# Patient Record
Sex: FEMALE | Race: WHITE | NOT HISPANIC OR LATINO
[De-identification: names, ages, dates, MRNs, and addresses within clinical notes are randomized per-mention and may not be internally consistent; named-entity substitution may affect disease eponyms.]

---

## 2017-02-09 ENCOUNTER — RX RENEWAL (OUTPATIENT)
Age: 62
End: 2017-02-09

## 2017-02-17 ENCOUNTER — RX RENEWAL (OUTPATIENT)
Age: 62
End: 2017-02-17

## 2017-03-09 ENCOUNTER — APPOINTMENT (OUTPATIENT)
Dept: ENDOCRINOLOGY | Facility: CLINIC | Age: 62
End: 2017-03-09

## 2017-03-09 DIAGNOSIS — Z80.9 FAMILY HISTORY OF MALIGNANT NEOPLASM, UNSPECIFIED: ICD-10-CM

## 2017-03-13 LAB
24R-OH-CALCIDIOL SERPL-MCNC: 67.6 PG/ML
25(OH)D3 SERPL-MCNC: 27.4 NG/ML
ALBUMIN SERPL ELPH-MCNC: 4.4 G/DL
ALP BLD-CCNC: 77 U/L
ALT SERPL-CCNC: 21 U/L
ANION GAP SERPL CALC-SCNC: 14 MMOL/L
AST SERPL-CCNC: 19 U/L
BILIRUB SERPL-MCNC: 0.3 MG/DL
BUN SERPL-MCNC: 17 MG/DL
CALCIUM SERPL-MCNC: 9.4 MG/DL
CALCIUM SERPL-MCNC: 9.4 MG/DL
CHLORIDE SERPL-SCNC: 104 MMOL/L
CO2 SERPL-SCNC: 26 MMOL/L
CREAT SERPL-MCNC: 0.72 MG/DL
GLUCOSE SERPL-MCNC: 52 MG/DL
PARATHYROID HORMONE INTACT: 69 PG/ML
POTASSIUM SERPL-SCNC: 4 MMOL/L
PROT SERPL-MCNC: 7.2 G/DL
SODIUM SERPL-SCNC: 144 MMOL/L

## 2017-03-28 ENCOUNTER — APPOINTMENT (OUTPATIENT)
Dept: ENDOCRINOLOGY | Facility: CLINIC | Age: 62
End: 2017-03-28

## 2017-03-28 VITALS — WEIGHT: 210 LBS | BODY MASS INDEX: 33.75 KG/M2 | HEIGHT: 66 IN

## 2017-05-22 ENCOUNTER — RX RENEWAL (OUTPATIENT)
Age: 62
End: 2017-05-22

## 2017-06-05 ENCOUNTER — RX RENEWAL (OUTPATIENT)
Age: 62
End: 2017-06-05

## 2017-06-26 ENCOUNTER — RX RENEWAL (OUTPATIENT)
Age: 62
End: 2017-06-26

## 2017-06-27 ENCOUNTER — APPOINTMENT (OUTPATIENT)
Dept: ENDOCRINOLOGY | Facility: CLINIC | Age: 62
End: 2017-06-27

## 2017-07-03 ENCOUNTER — APPOINTMENT (OUTPATIENT)
Dept: ENDOCRINOLOGY | Facility: CLINIC | Age: 62
End: 2017-07-03

## 2017-07-03 VITALS
HEART RATE: 86 BPM | DIASTOLIC BLOOD PRESSURE: 65 MMHG | SYSTOLIC BLOOD PRESSURE: 131 MMHG | WEIGHT: 209 LBS | BODY MASS INDEX: 33.59 KG/M2 | HEIGHT: 66 IN

## 2017-07-03 DIAGNOSIS — E66.3 OVERWEIGHT: ICD-10-CM

## 2017-07-11 ENCOUNTER — APPOINTMENT (OUTPATIENT)
Dept: ENDOCRINOLOGY | Facility: CLINIC | Age: 62
End: 2017-07-11

## 2017-07-14 ENCOUNTER — RX RENEWAL (OUTPATIENT)
Age: 62
End: 2017-07-14

## 2017-07-14 ENCOUNTER — OTHER (OUTPATIENT)
Age: 62
End: 2017-07-14

## 2017-07-14 LAB
ALBUMIN SERPL ELPH-MCNC: 4.3 G/DL
ALP BLD-CCNC: 84 U/L
ALT SERPL-CCNC: 25 U/L
ANION GAP SERPL CALC-SCNC: 16 MMOL/L
AST SERPL-CCNC: 21 U/L
BILIRUB SERPL-MCNC: 0.2 MG/DL
BUN SERPL-MCNC: 18 MG/DL
CALCIUM SERPL-MCNC: 9.9 MG/DL
CHLORIDE SERPL-SCNC: 102 MMOL/L
CO2 SERPL-SCNC: 25 MMOL/L
CREAT SERPL-MCNC: 0.93 MG/DL
CREAT SPEC-SCNC: 72 MG/DL
GLUCOSE SERPL-MCNC: 69 MG/DL
MICROALBUMIN 24H UR DL<=1MG/L-MCNC: 0.8 MG/DL
MICROALBUMIN/CREAT 24H UR-RTO: 11 MG/G
POTASSIUM SERPL-SCNC: 4.3 MMOL/L
PROT SERPL-MCNC: 7.4 G/DL
SODIUM SERPL-SCNC: 143 MMOL/L
T4 FREE SERPL-MCNC: 1.3 NG/DL
TSH SERPL-ACNC: 2.51 UIU/ML

## 2017-07-31 ENCOUNTER — APPOINTMENT (OUTPATIENT)
Dept: VASCULAR SURGERY | Facility: CLINIC | Age: 62
End: 2017-07-31
Payer: COMMERCIAL

## 2017-07-31 DIAGNOSIS — Z86.39 PERSONAL HISTORY OF OTHER ENDOCRINE, NUTRITIONAL AND METABOLIC DISEASE: ICD-10-CM

## 2017-07-31 PROCEDURE — 93970 EXTREMITY STUDY: CPT

## 2017-07-31 PROCEDURE — 99243 OFF/OP CNSLTJ NEW/EST LOW 30: CPT | Mod: 25

## 2017-08-03 PROBLEM — Z86.39 HISTORY OF HYPERLIPIDEMIA: Status: RESOLVED | Noted: 2017-08-03 | Resolved: 2017-08-03

## 2017-08-11 ENCOUNTER — OTHER (OUTPATIENT)
Age: 62
End: 2017-08-11

## 2017-08-11 ENCOUNTER — MEDICATION RENEWAL (OUTPATIENT)
Age: 62
End: 2017-08-11

## 2017-08-15 ENCOUNTER — OTHER (OUTPATIENT)
Age: 62
End: 2017-08-15

## 2017-08-22 ENCOUNTER — APPOINTMENT (OUTPATIENT)
Dept: ENDOCRINOLOGY | Facility: CLINIC | Age: 62
End: 2017-08-22

## 2017-08-24 ENCOUNTER — RX RENEWAL (OUTPATIENT)
Age: 62
End: 2017-08-24

## 2017-09-01 ENCOUNTER — OTHER (OUTPATIENT)
Age: 62
End: 2017-09-01

## 2017-09-21 ENCOUNTER — RX RENEWAL (OUTPATIENT)
Age: 62
End: 2017-09-21

## 2017-09-25 ENCOUNTER — RESULT REVIEW (OUTPATIENT)
Age: 62
End: 2017-09-25

## 2017-10-16 ENCOUNTER — RX RENEWAL (OUTPATIENT)
Age: 62
End: 2017-10-16

## 2017-11-13 ENCOUNTER — OTHER (OUTPATIENT)
Age: 62
End: 2017-11-13

## 2017-11-15 ENCOUNTER — APPOINTMENT (OUTPATIENT)
Dept: ENDOCRINOLOGY | Facility: CLINIC | Age: 62
End: 2017-11-15
Payer: COMMERCIAL

## 2017-11-15 VITALS
BODY MASS INDEX: 33.27 KG/M2 | DIASTOLIC BLOOD PRESSURE: 72 MMHG | HEIGHT: 66 IN | HEART RATE: 85 BPM | WEIGHT: 207 LBS | SYSTOLIC BLOOD PRESSURE: 146 MMHG

## 2017-11-15 PROCEDURE — 99214 OFFICE O/P EST MOD 30 MIN: CPT

## 2017-12-06 ENCOUNTER — RX RENEWAL (OUTPATIENT)
Age: 62
End: 2017-12-06

## 2018-01-02 ENCOUNTER — RX RENEWAL (OUTPATIENT)
Age: 63
End: 2018-01-02

## 2018-02-08 ENCOUNTER — RX RENEWAL (OUTPATIENT)
Age: 63
End: 2018-02-08

## 2018-02-21 ENCOUNTER — OTHER (OUTPATIENT)
Age: 63
End: 2018-02-21

## 2018-02-27 ENCOUNTER — OTHER (OUTPATIENT)
Age: 63
End: 2018-02-27

## 2018-03-13 ENCOUNTER — OTHER (OUTPATIENT)
Age: 63
End: 2018-03-13

## 2018-03-16 ENCOUNTER — FORM ENCOUNTER (OUTPATIENT)
Age: 63
End: 2018-03-16

## 2018-03-17 ENCOUNTER — APPOINTMENT (OUTPATIENT)
Dept: ULTRASOUND IMAGING | Facility: HOSPITAL | Age: 63
End: 2018-03-17
Payer: COMMERCIAL

## 2018-03-17 ENCOUNTER — OUTPATIENT (OUTPATIENT)
Dept: OUTPATIENT SERVICES | Facility: HOSPITAL | Age: 63
LOS: 1 days | End: 2018-03-17
Payer: COMMERCIAL

## 2018-03-17 PROCEDURE — 76536 US EXAM OF HEAD AND NECK: CPT

## 2018-03-17 PROCEDURE — 76536 US EXAM OF HEAD AND NECK: CPT | Mod: 26

## 2018-03-20 ENCOUNTER — APPOINTMENT (OUTPATIENT)
Dept: ENDOCRINOLOGY | Facility: CLINIC | Age: 63
End: 2018-03-20

## 2018-03-26 ENCOUNTER — OTHER (OUTPATIENT)
Age: 63
End: 2018-03-26

## 2018-03-31 ENCOUNTER — RESULT CHARGE (OUTPATIENT)
Age: 63
End: 2018-03-31

## 2018-03-31 ENCOUNTER — OTHER (OUTPATIENT)
Age: 63
End: 2018-03-31

## 2018-05-03 ENCOUNTER — RX RENEWAL (OUTPATIENT)
Age: 63
End: 2018-05-03

## 2018-05-04 ENCOUNTER — RX RENEWAL (OUTPATIENT)
Age: 63
End: 2018-05-04

## 2018-06-01 ENCOUNTER — RX RENEWAL (OUTPATIENT)
Age: 63
End: 2018-06-01

## 2018-06-01 ENCOUNTER — OTHER (OUTPATIENT)
Age: 63
End: 2018-06-01

## 2018-06-18 ENCOUNTER — RX RENEWAL (OUTPATIENT)
Age: 63
End: 2018-06-18

## 2018-06-22 ENCOUNTER — OTHER (OUTPATIENT)
Age: 63
End: 2018-06-22

## 2018-06-22 RX ORDER — PEN NEEDLE, DIABETIC 29 G X1/2"
31G X 5 MM NEEDLE, DISPOSABLE MISCELLANEOUS
Qty: 200 | Refills: 5 | Status: ACTIVE | COMMUNITY
Start: 2018-06-22 | End: 1900-01-01

## 2018-06-28 ENCOUNTER — APPOINTMENT (OUTPATIENT)
Dept: ENDOCRINOLOGY | Facility: CLINIC | Age: 63
End: 2018-06-28
Payer: COMMERCIAL

## 2018-06-28 ENCOUNTER — RX RENEWAL (OUTPATIENT)
Age: 63
End: 2018-06-28

## 2018-06-28 VITALS
HEART RATE: 87 BPM | HEIGHT: 66 IN | BODY MASS INDEX: 33.27 KG/M2 | SYSTOLIC BLOOD PRESSURE: 135 MMHG | WEIGHT: 207 LBS | DIASTOLIC BLOOD PRESSURE: 72 MMHG

## 2018-06-28 PROCEDURE — 99215 OFFICE O/P EST HI 40 MIN: CPT

## 2018-07-05 ENCOUNTER — OTHER (OUTPATIENT)
Age: 63
End: 2018-07-05

## 2018-07-10 ENCOUNTER — RX RENEWAL (OUTPATIENT)
Age: 63
End: 2018-07-10

## 2018-07-26 LAB
ALBUMIN SERPL ELPH-MCNC: 4.2 G/DL
ALP BLD-CCNC: 74 U/L
ALT SERPL-CCNC: 19 U/L
ANION GAP SERPL CALC-SCNC: 12 MMOL/L
AST SERPL-CCNC: 16 U/L
BILIRUB SERPL-MCNC: 0.3 MG/DL
BUN SERPL-MCNC: 23 MG/DL
CALCIUM SERPL-MCNC: 9.7 MG/DL
CHLORIDE SERPL-SCNC: 104 MMOL/L
CHOLEST SERPL-MCNC: 156 MG/DL
CHOLEST/HDLC SERPL: 2.1 RATIO
CO2 SERPL-SCNC: 27 MMOL/L
CREAT SERPL-MCNC: 0.87 MG/DL
CREAT SPEC-SCNC: 129 MG/DL
GLUCOSE SERPL-MCNC: 144 MG/DL
HBA1C MFR BLD HPLC: 8 %
HDLC SERPL-MCNC: 75 MG/DL
LDLC SERPL CALC-MCNC: 70 MG/DL
MICROALBUMIN 24H UR DL<=1MG/L-MCNC: 0.3 MG/DL
MICROALBUMIN/CREAT 24H UR-RTO: 2 MG/G
POTASSIUM SERPL-SCNC: 4.7 MMOL/L
PROT SERPL-MCNC: 7.3 G/DL
SODIUM SERPL-SCNC: 143 MMOL/L
TRIGL SERPL-MCNC: 55 MG/DL
TSH SERPL-ACNC: 2.55 UIU/ML

## 2018-08-21 ENCOUNTER — APPOINTMENT (OUTPATIENT)
Dept: ENDOCRINOLOGY | Facility: CLINIC | Age: 63
End: 2018-08-21
Payer: COMMERCIAL

## 2018-08-21 VITALS — BODY MASS INDEX: 33.43 KG/M2 | WEIGHT: 208 LBS | HEIGHT: 66 IN

## 2018-08-21 PROCEDURE — 76942 ECHO GUIDE FOR BIOPSY: CPT | Mod: 76

## 2018-08-21 PROCEDURE — 10022: CPT | Mod: 59

## 2018-08-21 PROCEDURE — 99213 OFFICE O/P EST LOW 20 MIN: CPT | Mod: 25

## 2018-11-23 ENCOUNTER — RX RENEWAL (OUTPATIENT)
Age: 63
End: 2018-11-23

## 2019-01-03 ENCOUNTER — APPOINTMENT (OUTPATIENT)
Dept: ENDOCRINOLOGY | Facility: CLINIC | Age: 64
End: 2019-01-03
Payer: COMMERCIAL

## 2019-01-03 VITALS
WEIGHT: 198 LBS | HEART RATE: 96 BPM | SYSTOLIC BLOOD PRESSURE: 147 MMHG | HEIGHT: 66 IN | BODY MASS INDEX: 31.82 KG/M2 | DIASTOLIC BLOOD PRESSURE: 81 MMHG

## 2019-01-03 PROCEDURE — 36415 COLL VENOUS BLD VENIPUNCTURE: CPT

## 2019-01-03 PROCEDURE — 99215 OFFICE O/P EST HI 40 MIN: CPT | Mod: 25

## 2019-01-03 NOTE — ASSESSMENT
[Long Term Vascular Complications] : long term vascular complications of diabetes [Importance of Diet and Exercise] : importance of diet and exercise to improve glycemic control, achieve weight loss and improve cardiovascular health [Action and use of Insulin] : action and use of short and long-acting insulin [Self Monitoring of Blood Glucose] : self monitoring of blood glucose [FreeTextEntry1] : #1- uncontrolled diabetes mellitus July 2018 A1c 8%\par Diabetes treatment goals discussed, targets of preprandial around 100 and postprandial below 180\par Lower extremities no pitting edema being evaluated by vascular\par Continue with multiple insulin injections included NPH at bedtime\par Benefits of weight loss and increase physical activity as explained\par \par #2- thyroid nodules, recent FNA benign\par Monitor thyroid size growth rate\par \par #3- clinically euthyroid on thyroid supplementation

## 2019-01-03 NOTE — HISTORY OF PRESENT ILLNESS
[FreeTextEntry1] : long-standing history of diabetes, also history of thyroid nodules\par Recently diagnosed with a upper respiratory infection which she continues having symptoms. No fever\par 8/21/18 Thyroid FNA biopsy : [place] II\par Saw vascular MD in [place] Dr. 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: normal\par Sees Cardilogist at [facility] for palpitations, next appointment is with electrophysiologist\par  Meds: Humalog 10 u ac for breakfast and lunch and 10-14 u for dinner, NPH 22 u at Hs , and Lantus 22u qd\par  FS: < 140's

## 2019-01-03 NOTE — PHYSICAL EXAM
[Alert] : alert [Normal Outer Ear/Nose] : the ears and nose were normal in appearance [No Neck Mass] : no neck mass was observed [Normal PMI] : the apical impulse was normal [Normal Bowel Sounds] : normal bowel sounds [No CVA Tenderness] : no ~M costovertebral angle tenderness [Normal Gait] : normal gait [No Rash] : no rash [Cranial Nerves Intact] : cranial nerves 2-12 were intact [Oriented x3] : oriented to person, place, and time [de-identified] : ou rtp [de-identified] : no palpable nodules [de-identified] : lawson bilaterally [de-identified] : right lower extremity 1+ DP

## 2019-01-03 NOTE — REVIEW OF SYSTEMS
[Polyuria] : polyuria [Nocturia] : nocturia [Joint Pain] : joint pain [Negative] : Gastrointestinal [Dizziness] : no dizziness [Pain/Numbness of Digits] : no pain/numbness of digits [FreeTextEntry3] : OU Retinopathy

## 2019-01-24 LAB
ALBUMIN SERPL ELPH-MCNC: 3.9 G/DL
ALP BLD-CCNC: 65 U/L
ALT SERPL-CCNC: 19 U/L
ANION GAP SERPL CALC-SCNC: 10 MMOL/L
AST SERPL-CCNC: 19 U/L
BILIRUB SERPL-MCNC: 0.2 MG/DL
BUN SERPL-MCNC: 15 MG/DL
CALCIUM SERPL-MCNC: 9.1 MG/DL
CHLORIDE SERPL-SCNC: 103 MMOL/L
CHOLEST SERPL-MCNC: 192 MG/DL
CHOLEST/HDLC SERPL: 4 RATIO
CO2 SERPL-SCNC: 27 MMOL/L
CREAT SERPL-MCNC: 0.82 MG/DL
CREAT SPEC-SCNC: 149 MG/DL
GLUCOSE SERPL-MCNC: 140 MG/DL
HBA1C MFR BLD HPLC: 8 %
HDLC SERPL-MCNC: 48 MG/DL
LDLC SERPL CALC-MCNC: 128 MG/DL
MICROALBUMIN 24H UR DL<=1MG/L-MCNC: <1.2 MG/DL
MICROALBUMIN/CREAT 24H UR-RTO: NORMAL
POTASSIUM SERPL-SCNC: 4.7 MMOL/L
PROT SERPL-MCNC: 6.8 G/DL
SODIUM SERPL-SCNC: 140 MMOL/L
TRIGL SERPL-MCNC: 82 MG/DL
TSH SERPL-ACNC: 3.21 UIU/ML

## 2019-01-30 ENCOUNTER — RX RENEWAL (OUTPATIENT)
Age: 64
End: 2019-01-30

## 2019-02-04 ENCOUNTER — OTHER (OUTPATIENT)
Age: 64
End: 2019-02-04

## 2019-02-21 ENCOUNTER — RX RENEWAL (OUTPATIENT)
Age: 64
End: 2019-02-21

## 2019-03-20 ENCOUNTER — RX RENEWAL (OUTPATIENT)
Age: 64
End: 2019-03-20

## 2019-04-19 ENCOUNTER — OTHER (OUTPATIENT)
Age: 64
End: 2019-04-19

## 2019-05-22 ENCOUNTER — RX RENEWAL (OUTPATIENT)
Age: 64
End: 2019-05-22

## 2019-06-20 ENCOUNTER — APPOINTMENT (OUTPATIENT)
Dept: ENDOCRINOLOGY | Facility: CLINIC | Age: 64
End: 2019-06-20

## 2019-07-02 ENCOUNTER — APPOINTMENT (OUTPATIENT)
Dept: ENDOCRINOLOGY | Facility: CLINIC | Age: 64
End: 2019-07-02
Payer: COMMERCIAL

## 2019-07-02 VITALS
WEIGHT: 207 LBS | HEART RATE: 67 BPM | DIASTOLIC BLOOD PRESSURE: 83 MMHG | BODY MASS INDEX: 33.41 KG/M2 | SYSTOLIC BLOOD PRESSURE: 142 MMHG

## 2019-07-02 PROCEDURE — 99214 OFFICE O/P EST MOD 30 MIN: CPT | Mod: 25

## 2019-07-02 PROCEDURE — 82962 GLUCOSE BLOOD TEST: CPT

## 2019-07-05 NOTE — HISTORY OF PRESENT ILLNESS
[FreeTextEntry1] : 8/21/18 Thyroid FNA biopsy : [place] II\par 6/22/19 s. 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 Kal Prince\par Angio done in July at [facility]: normal\par Sees Cardiologist at [facility] for palpitations, next appointment is with electrophysiologist\par \par Today pt presents for endocrine f/u. Pt rarely has episodes of hypoglycemia. \par Pt states she sees a cardiologist every 4 months now and that he told her she still has skipped heart beats. \par She notes she had an angiogram last summer.\par Pt gained 8lbs since last visit. \par \par Meds: Humalog 10 u ac for breakfast and lunch and 10-14 u for dinner, Humulin at night 24u, NPH 22 u at Hs , Lantus 26u QD, Atorvastatin 20mg, Levothyroxine 112mcg

## 2019-07-05 NOTE — ASSESSMENT
[Long Term Vascular Complications] : long term vascular complications of diabetes [Importance of Diet and Exercise] : importance of diet and exercise to improve glycemic control, achieve weight loss and improve cardiovascular health [Action and use of Insulin] : action and use of short and long-acting insulin [Self Monitoring of Blood Glucose] : self monitoring of blood glucose [FreeTextEntry1] : 1. Hx of T2DM, clinically improved:\par Pt is on multiple insulin injections including NPH at bed time. Recommend pt continue present DM management, f/u with podiatrist and ophthalmologist.\par \par 2. Hx of hypothyroidism:\par Pt is clinically euthyroid. Recommend pt continue 112mcg Levothyroxine.\par \par Return in 4 months.

## 2019-07-05 NOTE — REVIEW OF SYSTEMS
[Recent Weight Gain (___ Lbs)] : recent [unfilled] ~Ulb weight gain [As Noted in HPI] : as noted in HPI [Negative] : Psychiatric [Dizziness] : no dizziness [Pain/Numbness of Digits] : no pain/numbness of digits [de-identified] : rare episodes of hypoglycemia

## 2019-07-05 NOTE — PHYSICAL EXAM
[Alert] : alert [Normal Outer Ear/Nose] : the ears and nose were normal in appearance [No Neck Mass] : no neck mass was observed [Normal PMI] : the apical impulse was normal [Normal Bowel Sounds] : normal bowel sounds [No Rash] : no rash [Normal Gait] : normal gait [Cranial Nerves Intact] : cranial nerves 2-12 were intact [Oriented x3] : oriented to person, place, and time [Spine Straight] : spine straight [Right Foot Was Examined] : right foot ~C was examined [Left Foot Was Examined] : left foot ~C was examined [2+] : 2+ in the dorsalis pedis [No Respiratory Distress] : no respiratory distress [de-identified] : ou rtp [de-identified] : no palpable nodules [de-identified] : 1+ b/l pitting edema

## 2019-09-02 LAB — GLUCOSE BLDC GLUCOMTR-MCNC: 69

## 2019-11-11 ENCOUNTER — APPOINTMENT (OUTPATIENT)
Dept: ENDOCRINOLOGY | Facility: CLINIC | Age: 64
End: 2019-11-11
Payer: COMMERCIAL

## 2019-11-11 VITALS
BODY MASS INDEX: 33.27 KG/M2 | HEIGHT: 66 IN | WEIGHT: 207 LBS | HEART RATE: 86 BPM | DIASTOLIC BLOOD PRESSURE: 64 MMHG | SYSTOLIC BLOOD PRESSURE: 132 MMHG

## 2019-11-11 DIAGNOSIS — M81.0 AGE-RELATED OSTEOPOROSIS W/OUT CURRENT PATHOLOGICAL FRACTURE: ICD-10-CM

## 2019-11-11 LAB — GLUCOSE BLDC GLUCOMTR-MCNC: 98

## 2019-11-11 PROCEDURE — 82962 GLUCOSE BLOOD TEST: CPT | Mod: NC

## 2019-11-11 PROCEDURE — 99214 OFFICE O/P EST MOD 30 MIN: CPT | Mod: 25

## 2019-11-12 PROBLEM — M81.0 OSTEOPOROSIS, POSTMENOPAUSAL: Status: ACTIVE | Noted: 2017-03-09

## 2019-11-21 ENCOUNTER — FORM ENCOUNTER (OUTPATIENT)
Age: 64
End: 2019-11-21

## 2019-11-22 ENCOUNTER — APPOINTMENT (OUTPATIENT)
Dept: RADIOLOGY | Facility: CLINIC | Age: 64
End: 2019-11-22
Payer: COMMERCIAL

## 2019-11-22 ENCOUNTER — OUTPATIENT (OUTPATIENT)
Dept: OUTPATIENT SERVICES | Facility: HOSPITAL | Age: 64
LOS: 1 days | End: 2019-11-22

## 2019-11-22 PROCEDURE — 77080 DXA BONE DENSITY AXIAL: CPT | Mod: 26

## 2019-12-18 ENCOUNTER — RX RENEWAL (OUTPATIENT)
Age: 64
End: 2019-12-18

## 2019-12-18 ENCOUNTER — OTHER (OUTPATIENT)
Age: 64
End: 2019-12-18

## 2020-01-07 NOTE — ASSESSMENT
[Long Term Vascular Complications] : long term vascular complications of diabetes [Importance of Diet and Exercise] : importance of diet and exercise to improve glycemic control, achieve weight loss and improve cardiovascular health [Action and use of Insulin] : action and use of short and long-acting insulin [Self Monitoring of Blood Glucose] : self monitoring of blood glucose [Levothyroxine] : The patient was instructed to take Levothyroxine on an empty stomach, separate from vitamins, and wait at least 30 minutes before eating [FreeTextEntry1] : 63 y/o F with\par \par 1. Hx of T1DM,fair controlled\par Pt is on multiple insulin injections including NPH. Pt had cardiac stress test, no need for revascularization\par Hyperlipidemia - Pt is on statins\par \par 2. Hx of thyroid nodule L 1.7cm nodule, stable:\par Will reassess in one year.\par \par 3. Hx of osteoporosis\par Reassessed, ordered bone density\par \par f/u 3 months

## 2020-01-07 NOTE — REVIEW OF SYSTEMS
[Negative] : Musculoskeletal [Dizziness] : no dizziness [de-identified] : rare episodes of hypoglycemia  [Pain/Numbness of Digits] : no pain/numbness of digits

## 2020-01-07 NOTE — END OF VISIT
[Time Spent: ___ minutes] : I have spent [unfilled] minutes of face to face time with the patient [>50% of Time Spent on Counseling for ____] : Greater than 50% of the encounter time was spent on counseling for [unfilled] [FreeTextEntry3] : All medical record entries made by the scribe were at my, Dr. Richie Steinberg, direction and personally dictated by me on 11/11/2019 I have reviewed the chart and agree that the record accurately reflects my personal performance of the history, physical exam, assessment and plan. I have also personally directed, reviewed and agreed with the chart.

## 2020-01-07 NOTE — PHYSICAL EXAM
[Alert] : alert [Normal Outer Ear/Nose] : the ears and nose were normal in appearance [No Neck Mass] : no neck mass was observed [No Respiratory Distress] : no respiratory distress [Normal PMI] : the apical impulse was normal [Normal Bowel Sounds] : normal bowel sounds [Spine Straight] : spine straight [Normal Gait] : normal gait [No Rash] : no rash [Right Foot Was Examined] : right foot ~C was examined [Cranial Nerves Intact] : cranial nerves 2-12 were intact [Left Foot Was Examined] : left foot ~C was examined [Oriented x3] : oriented to person, place, and time [No Stigmata of Cushings Syndrome] : no stigmata of cushings syndrome [2+] : 2+ in the dorsalis pedis [de-identified] : ou rtp [de-identified] : no palpable nodules [de-identified] : 1+ b/l pitting edema

## 2020-01-07 NOTE — ADDENDUM
[FreeTextEntry1] : I, Bryce Richardson, acted solely as a scribe for Dr. Richie Steinberg on this date. 11/11/2019. \par \par \par Pt is on MDD of insulin and checks BG x4 times a day

## 2020-01-07 NOTE — HISTORY OF PRESENT ILLNESS
[FreeTextEntry1] : 11/2012: L thyroid nodule 1.7 cm, and R 0.5 cm nodule with calcification \par 3/17/18 Thyroid US: R  lobe inferior nodule 1: 1.1 x 1.1 x 0.9 cm solid, nodule 2: 1.1x 1.1 x 1.1 cm solid, nodule 3: 0.7 cm. L lobe mid inferior 1.7 x 1.5 x 0.9 cm solid nodule, nodule 2 inferior 1.3 x 0.9 x 1.1 solid \par 8/21/18 Thyroid FNA biopsy : L mid 1.1cm [place] II\par 6/22/19 s. 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 Kal Prince\par Angio done in July at [facility]: normal and no surgery needed. Cardiac stress test 2018\par Sees Cardiologist [facility] for [facility]ations, next appointment is with electrophysiologist\par \par Today pt presents for endocrine f/u. Pt rarely has episodes of hypoglycemia. \par Pt states she sees a cardiologist every 4 months now and that he told her she still has skipped heart beats. \par She notes she had an angiogram last summer.\par Pt gained 8lbs since last visit. \par \par 11/11/2019 \par Pt presents today for an endocrine f/u, feeling well overall. Pt reports visiting her vascular surgeon for swelling in the legs and knee. Pt reports experiencing hypoglycemia once or twice a month.\par \par Meds: Humalog 10/10/18 u, Humulin at night 24u, NPH 22 u at Hs , Lantus 26u QD, Atorvastatin 40mg, Levothyroxine 112mcg, Fosinopril 20mg,

## 2020-01-16 ENCOUNTER — APPOINTMENT (OUTPATIENT)
Dept: ENDOCRINOLOGY | Facility: CLINIC | Age: 65
End: 2020-01-16
Payer: COMMERCIAL

## 2020-01-16 VITALS
HEART RATE: 85 BPM | SYSTOLIC BLOOD PRESSURE: 146 MMHG | DIASTOLIC BLOOD PRESSURE: 72 MMHG | BODY MASS INDEX: 33.11 KG/M2 | WEIGHT: 206 LBS | HEIGHT: 66 IN

## 2020-01-16 DIAGNOSIS — R68.89 OTHER GENERAL SYMPTOMS AND SIGNS: ICD-10-CM

## 2020-01-16 PROCEDURE — 82962 GLUCOSE BLOOD TEST: CPT | Mod: NC

## 2020-01-16 PROCEDURE — 99214 OFFICE O/P EST MOD 30 MIN: CPT

## 2020-01-16 RX ORDER — DULOXETINE HYDROCHLORIDE 30 MG/1
30 CAPSULE, DELAYED RELEASE PELLETS ORAL TWICE DAILY
Qty: 60 | Refills: 2 | Status: ACTIVE | COMMUNITY
Start: 2020-01-16 | End: 1900-01-01

## 2020-01-17 NOTE — PHYSICAL EXAM
[Alert] : alert [Normal Outer Ear/Nose] : the ears and nose were normal in appearance [No Respiratory Distress] : no respiratory distress [No Neck Mass] : no neck mass was observed [Normal PMI] : the apical impulse was normal [Spine Straight] : spine straight [No Stigmata of Cushings Syndrome] : no stigmata of cushings syndrome [Normal Bowel Sounds] : normal bowel sounds [No Rash] : no rash [Normal Gait] : normal gait [2+] : 2+ in the dorsalis pedis [Right Foot Was Examined] : right foot ~C was examined [Left Foot Was Examined] : left foot ~C was examined [Oriented x3] : oriented to person, place, and time [Cranial Nerves Intact] : cranial nerves 2-12 were intact [de-identified] : ou rtp [de-identified] : no palpable nodules [de-identified] : 1+ b/l pitting edema

## 2020-01-17 NOTE — END OF VISIT
[>50% of Time Spent on Counseling for ____] : Greater than 50% of the encounter time was spent on counseling for [unfilled] [Time Spent: ___ minutes] : I have spent [unfilled] minutes of face to face time with the patient [FreeTextEntry3] : All medical record entries made by the Scribe were at my, Dr. Richie Steinberg, direction and personally dictated by me on 01/16/2020. I have reviewed the chart and agree that the record accurately reflects my personal performance of the history, physical exam, assessment and plan. I have also personally directed, reviewed and agreed with the chart.

## 2020-01-17 NOTE — ASSESSMENT
[Long Term Vascular Complications] : long term vascular complications of diabetes [Importance of Diet and Exercise] : importance of diet and exercise to improve glycemic control, achieve weight loss and improve cardiovascular health [Self Monitoring of Blood Glucose] : self monitoring of blood glucose [Action and use of Insulin] : action and use of short and long-acting insulin [Levothyroxine] : The patient was instructed to take Levothyroxine on an empty stomach, separate from vitamins, and wait at least 30 minutes before eating [FreeTextEntry1] : 63 y/o F with\par \par \par 1. Hx of T1DM; recent A1c 7.2% (11/16/19)\par DM is fairly controlled. Advised pt to continue with life and diet management and to focus on weight reduction. \par  Started pt on a trial of Cymbalta; if no response, will refer pt to rheumatology.\par \par 2.  Pt is complaining of forgetfulness and difficulty with calling out correct words, more often with people's names whom she knows very well. Refer pt to neurologist for evaluation\par .\par Return in: 4 months

## 2020-01-17 NOTE — REVIEW OF SYSTEMS
[Negative] : Endocrine [Joint Pain] : joint pain [As Noted in HPI] : as noted in HPI [Chest Pain] : no chest pain [Shortness Of Breath] : no shortness of breath [Dizziness] : no dizziness [Pain/Numbness of Digits] : no pain/numbness of digits [de-identified] : rare episodes of hypoglycemia

## 2020-01-17 NOTE — ADDENDUM
[FreeTextEntry1] : I, Tuyet Altamirano, acted solely as a scribe for Dr. Richie Steinberg on this date. 01/16/2020.

## 2020-04-07 ENCOUNTER — RX RENEWAL (OUTPATIENT)
Age: 65
End: 2020-04-07

## 2020-04-13 LAB — GLUCOSE BLDC GLUCOMTR-MCNC: 173

## 2020-05-07 ENCOUNTER — APPOINTMENT (OUTPATIENT)
Dept: ENDOCRINOLOGY | Facility: CLINIC | Age: 65
End: 2020-05-07

## 2020-05-19 ENCOUNTER — RX RENEWAL (OUTPATIENT)
Age: 65
End: 2020-05-19

## 2020-06-14 RX ORDER — PEN NEEDLE, DIABETIC 29 G X1/2"
32G X 4 MM NEEDLE, DISPOSABLE MISCELLANEOUS
Qty: 450 | Refills: 3 | Status: ACTIVE | COMMUNITY
Start: 2017-05-22 | End: 1900-01-01

## 2020-06-15 ENCOUNTER — RX RENEWAL (OUTPATIENT)
Age: 65
End: 2020-06-15

## 2020-07-24 ENCOUNTER — APPOINTMENT (OUTPATIENT)
Dept: ENDOCRINOLOGY | Facility: CLINIC | Age: 65
End: 2020-07-24
Payer: COMMERCIAL

## 2020-07-24 DIAGNOSIS — Z86.39 PERSONAL HISTORY OF OTHER ENDOCRINE, NUTRITIONAL AND METABOLIC DISEASE: ICD-10-CM

## 2020-07-24 DIAGNOSIS — Z11.59 ENCOUNTER FOR SCREENING FOR OTHER VIRAL DISEASES: ICD-10-CM

## 2020-07-24 DIAGNOSIS — Z86.79 PERSONAL HISTORY OF OTHER DISEASES OF THE CIRCULATORY SYSTEM: ICD-10-CM

## 2020-07-24 DIAGNOSIS — E78.00 PURE HYPERCHOLESTEROLEMIA, UNSPECIFIED: ICD-10-CM

## 2020-07-24 PROCEDURE — 99215 OFFICE O/P EST HI 40 MIN: CPT | Mod: 95

## 2020-07-24 NOTE — HISTORY OF PRESENT ILLNESS
[Home] : at home, [unfilled] , at the time of the visit. [Other Location: e.g. Home (Enter Location, City,State)___] : at [unfilled] [Verbal consent obtained from patient] : the patient, [unfilled] [FreeTextEntry1] : - 11/2012: L thyroid nodule 1.7 cm, and R 0.5 cm nodule with calcification \par - 3/17/18 Thyroid US: R  lobe inferior nodule 1: 1.1 x 1.1 x 0.9 cm solid, nodule 2: 1.1x 1.1 x 1.1 cm solid, nodule 3: 0.7 cm. L lobe mid inferior 1.7 x 1.5 x 0.9 cm solid nodule, nodule 2 inferior 1.3 x 0.9 x 1.1 solid \par - 8/21/18 Thyroid FNA biopsy : L mid 1.1cm [place] II\par - 6/22/19 s. 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 Kal Prince\par Angio done in July at [facility]: normal and no surgery needed. Cardiac stress test 2018\par Sees Cardiologist at [facility] for palpitations, next appointment is with electrophysiologist\par \par Today pt presents for endocrine f/u. Pt rarely has episodes of hypoglycemia. \par Pt states she sees a cardiologist every 4 months now and that he told her she still has skipped heart beats. \par She notes she had an angiogram last summer.\par Pt gained 8lbs since last visit. \par \par 11/11/2019 \par Pt presents today for an endocrine f/u, feeling well overall. Pt reports visiting her vascular surgeon for swelling in the legs and knee. Pt reports experiencing hypoglycemia once or twice a month.\par \par 01/16/2020 \par Pt presents today with POCT 173, feeling well with c/o knuckle and LE pain for about 1 year. Pt reports developing ankle pain which spreads to her knee and to her calf. She has never followed up with a rheumatologist.\par Pt reports that her BS is stable for 1.5 months with 1-2 hypoglycemic episodes and high BS. Christopher SOB and CP.\par In past 4-6 months, pt reports calling out wrong names of people she knows she knows despite knowing their correct name. She reportedly only notices the mistake after she has called out the name. She mentions that she has become somewhat forgetful and has trouble calling out words that she is thinking of. She is not sure if this occurs when she is reading. 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

## 2020-07-24 NOTE — REVIEW OF SYSTEMS
[Nasal Congestion] : nasal congestion [Neck Pain] : no neck pain [Chest Pain] : no chest pain [Palpitations] : no palpitations [Nocturia] : no nocturia [Muscle Weakness] : no muscle weakness [Polyuria] : no polyuria [Polydipsia] : no polydipsia [Heat Intolerance] : no heat intolerance [Negative] : Neurological

## 2020-07-24 NOTE — ASSESSMENT
[Importance of Diet and Exercise] : importance of diet and exercise to improve glycemic control, achieve weight loss and improve cardiovascular health [Long Term Vascular Complications] : long term vascular complications of diabetes [Self Monitoring of Blood Glucose] : self monitoring of blood glucose [Action and use of Insulin] : action and use of short and long-acting insulin [Levothyroxine] : The patient was instructed to take Levothyroxine on an empty stomach, separate from vitamins, and wait at least 30 minutes before eating [Hypoglycemia Management] : hypoglycemia management [FreeTextEntry1] : 63 y/o F with\par \par \par 1. Hx of T1DM; recent A1c 7.2% (11/16/19)\par DM is fairly controlled. Advised pt to continue with life and diet management and to focus on weight reduction. \par To prevent and decrease hypoglycemias , decrease basal insulin to 20 u down from 24, continue with NPH 24 u at 8 pm , and humalog 10 u ac meal\par \par 2. Hypothyroidism. She is clinically euthyroid however tsh is low  0.38 on lt4 112 mcg\par Sending TFT\par \par 3- Thyroid nodules. FNA biopsy in 2018 [place] II. No upper respiratory obstruction symptoms\par  Order a thyroid u/s for next visit\par \par 4- Hypercholesterolemia. No CAD diagnosis, She is on statins, recent LDL- c 66\par \par Return in: 4 months

## 2020-08-17 ENCOUNTER — RX RENEWAL (OUTPATIENT)
Age: 65
End: 2020-08-17

## 2021-02-23 RX ORDER — BLOOD SUGAR DIAGNOSTIC
31G X 5/16" STRIP MISCELLANEOUS
Qty: 360 | Refills: 2 | Status: ACTIVE | COMMUNITY
Start: 2017-05-22 | End: 1900-01-01

## 2021-02-23 RX ORDER — GLUCAGON 1 MG
1 KIT INJECTION
Qty: 1 | Refills: 0 | Status: ACTIVE | COMMUNITY
Start: 2021-02-23 | End: 1900-01-01

## 2021-03-02 ENCOUNTER — RX RENEWAL (OUTPATIENT)
Age: 66
End: 2021-03-02

## 2021-06-23 ENCOUNTER — APPOINTMENT (OUTPATIENT)
Dept: ENDOCRINOLOGY | Facility: CLINIC | Age: 66
End: 2021-06-23
Payer: COMMERCIAL

## 2021-06-23 VITALS
SYSTOLIC BLOOD PRESSURE: 153 MMHG | BODY MASS INDEX: 33.59 KG/M2 | DIASTOLIC BLOOD PRESSURE: 81 MMHG | HEART RATE: 79 BPM | HEIGHT: 66 IN | WEIGHT: 209 LBS

## 2021-06-23 PROCEDURE — 99215 OFFICE O/P EST HI 40 MIN: CPT | Mod: 25

## 2021-06-23 PROCEDURE — 82962 GLUCOSE BLOOD TEST: CPT | Mod: NC

## 2021-06-23 PROCEDURE — 99072 ADDL SUPL MATRL&STAF TM PHE: CPT

## 2021-06-25 NOTE — PHYSICAL EXAM
[Alert] : alert [Normal Sclera/Conjunctiva] : normal sclera/conjunctiva [Normal Outer Ear/Nose] : the ears and nose were normal in appearance [No Neck Mass] : no neck mass was observed [Thyroid Not Enlarged] : the thyroid was not enlarged [No Thyroid Nodules] : no palpable thyroid nodules [No Respiratory Distress] : no respiratory distress [Normal S1, S2] : normal S1 and S2 [Spine Straight] : spine straight [Normal Gait] : normal gait [No Rash] : no rash [Normal Reflexes] : deep tendon reflexes were 2+ and symmetric [Oriented x3] : oriented to person, place, and time [de-identified] : non pitting edema

## 2021-06-25 NOTE — END OF VISIT
[FreeTextEntry3] : All medical record entries made by the Scribe were at my, Dr. Richie Steinberg, direction and personally dictated by me on 06/23/2021. I have reviewed the chart and agree that the record accurately reflects my personal performance of the history, physical exam, assessment and plan. I have also personally directed, reviewed and agreed with the chart.  [Time Spent: ___ minutes] : I have spent [unfilled] minutes of time on the encounter.

## 2021-06-25 NOTE — ASSESSMENT
[FreeTextEntry1] : 67 y/o F with\par \par 1. Longstanding Hx of T1DM:\par Her last visit was in 7/2020. Pt appears to be in good spirit. She is excited that she is retiring soon. Pt reports that her stress level has diminished significantly. \par Recent A1c of 7.0%. She developed hypoglycemia two days ago because she skipped her dinner. \par Continue with MDI. Recommend to decrease NPH to 16 u. \par Pt has been evaluated by vascular surgeon for LE edema. Follow up with ophthalmologist, podiatrist and internist. \par \par 2. Hypothyroidism:\par Pt is being followed up by cardiologist because of occasional extra cardiac beats. She has discussed cardiac ablation. \par Recent TSH 0.33. Recommend to decrease Levothyroxine from 112 mcg to 100 mcg \par \par 3. Hx of thyroid nodules. \par FNA biopsy in 2018 reported [place] II.  She is asymptomatic. \par Will discuss sending thyroid US at her next visit. \par \par Return in: 4 months [Carbohydrate Consistent Diet] : carbohydrate consistent diet [Importance of Diet and Exercise] : importance of diet and exercise to improve glycemic control, achieve weight loss and improve cardiovascular health [Exercise/Effect on Glucose] : exercise/effect on glucose [Action and use of Insulin] : action and use of short and long-acting insulin [Self Monitoring of Blood Glucose] : self monitoring of blood glucose [Weight Loss] : weight loss

## 2021-06-25 NOTE — ADDENDUM
[FreeTextEntry1] : I, Tuyet Altamirano, acted solely as a scribe for Dr. Richie Steinberg on this date. 06/23/2021.

## 2021-06-25 NOTE — HISTORY OF PRESENT ILLNESS
[FreeTextEntry1] : 65 y/o F pt, with longstanding Hx of T1DM, and Hx of thyroid nodules.\par Other PMHx: R foot fracture, Osteoarthritis and bone union on b/l great toes (dx by podiatrist)\par SHx: Retiring on 6/30/21\par Last funduscopic visit: recently for eye floaters.\par  \par 06/23/2021\par Pt presents today with POCT 149, /81 and BMI 33.73 for endocrine f/u. FBS around 130 as per pt. \par Pt reportedly experienced severe hypoglycemia two days ago. While preparing her dinner, she went to read text message on her phone in another room, got distracted and forgot that she was making dinner. She fell asleep on the chair and almost slid off the chair when she woke up at 4 AM. Pt states that she "was a mess" and in lot of pain which took 2 days to recover from. \par Pt wants to get her heart checked again. Previously, she was experiencing palpitations, and was informed that there was electrical activity in the L ventricle and she may need to undergo ablation if palpitations continues. However, she has not experienced palpitations since then. \par Pt is retiring on 6/30/21. She will stay here for a while and will decide if she wants to return to [place] or continue to live here. \par \par Current Medications: Humulin 20 u qpm (decreased last visit 7/2020), NPH 20 u at 8 PM, Lantus 20 u, Humalog 10 u ac, Atorvastatin 40 mg, Levothyroxine 112 mcg, Fosinopril 20 mg, Amlodipine\par \par Labs:\par - 06/05/21: A1c 7.0%, s.creat 0.82, LDL-c 76, TSH 0.33, Free T4 1.2, Vit D 25-OH 47\par - 07/31/20: A1c 7.6%, s.creat 0.80, LDL-c 73, Microalb/Creat 7, TSH 0.89, Free T4 1.3\par - 11/22/19 BMD: L hip T-score 0.9, Femoral neck 0.1, Spine T-score 0.8.\par - 11/16/19: A1c 7.2%, s.creat 0.81,  LDL-c 66, Microalb/Creat 9, TSH 0.38, Free T4 1.3\par - 06/22/19: A1c 7.0%, s.creat 0.77, LDL-c 106\par - 08/21/18 FNA L mid 1.1 cm nodule: [place] II\par - 03/17/18 Thyroid US: R lobe inferior nodule 1: 1.1 x 1.1 x 0.9 cm solid, nodule 2: 1.1x 1.1 x 1.1 cm solid, nodule 3: 0.7 cm. L lobe mid inferior 1.7 x 1.5 x 0.9 cm solid nodule, nodule 2 inferior 1.3 x 0.9 x 1.1 solid. \par - 11/2012 Thyroid US: L thyroid nodule 1.7 cm, and R 0.5 cm nodule with calcification.

## 2021-06-25 NOTE — RESULTS/DATA
[Hologic] : hologic [T-Score ___] : T-score: [unfilled] [Major Osteoporotic Fracture (%): ___] : Major Osteoporotic Fracture (%):[unfilled] [Hip Fracture (%):___] : Hip Fracture (%): [unfilled] [FreeTextEntry2] : 11/22/2019 [FreeTextEntry1] : IMPRESSION:\par Normal bone density in the left hip and lumbar spine. \par

## 2021-07-20 LAB — GLUCOSE BLDC GLUCOMTR-MCNC: 149

## 2021-07-21 ENCOUNTER — APPOINTMENT (OUTPATIENT)
Dept: ENDOCRINOLOGY | Facility: CLINIC | Age: 66
End: 2021-07-21

## 2021-08-09 ENCOUNTER — TRANSCRIPTION ENCOUNTER (OUTPATIENT)
Age: 66
End: 2021-08-09

## 2021-08-12 ENCOUNTER — NON-APPOINTMENT (OUTPATIENT)
Age: 66
End: 2021-08-12

## 2021-09-21 ENCOUNTER — RX RENEWAL (OUTPATIENT)
Age: 66
End: 2021-09-21

## 2021-10-20 ENCOUNTER — APPOINTMENT (OUTPATIENT)
Dept: ENDOCRINOLOGY | Facility: CLINIC | Age: 66
End: 2021-10-20

## 2022-01-03 ENCOUNTER — RX RENEWAL (OUTPATIENT)
Age: 67
End: 2022-01-03

## 2022-02-24 ENCOUNTER — NON-APPOINTMENT (OUTPATIENT)
Age: 67
End: 2022-02-24

## 2022-02-24 ENCOUNTER — APPOINTMENT (OUTPATIENT)
Dept: ENDOCRINOLOGY | Facility: CLINIC | Age: 67
End: 2022-02-24
Payer: MEDICARE

## 2022-02-24 VITALS
WEIGHT: 211 LBS | HEART RATE: 82 BPM | SYSTOLIC BLOOD PRESSURE: 91 MMHG | DIASTOLIC BLOOD PRESSURE: 65 MMHG | HEIGHT: 66 IN | BODY MASS INDEX: 33.91 KG/M2

## 2022-02-24 DIAGNOSIS — R60.0 LOCALIZED EDEMA: ICD-10-CM

## 2022-02-24 PROCEDURE — 82962 GLUCOSE BLOOD TEST: CPT

## 2022-02-24 PROCEDURE — 99215 OFFICE O/P EST HI 40 MIN: CPT | Mod: 25

## 2022-02-24 RX ORDER — INSULIN GLULISINE 100 [IU]/ML
100 INJECTION, SOLUTION SUBCUTANEOUS
Qty: 1 | Refills: 0 | Status: DISCONTINUED | COMMUNITY
Start: 2017-08-11 | End: 2022-02-24

## 2022-02-24 RX ORDER — AMLODIPINE BESYLATE 10 MG/1
10 TABLET ORAL
Qty: 30 | Refills: 6 | Status: DISCONTINUED | COMMUNITY
Start: 2018-06-28 | End: 2022-02-24

## 2022-02-25 PROBLEM — R60.0 LOWER EXTREMITY EDEMA: Status: ACTIVE | Noted: 2017-08-03

## 2022-02-25 NOTE — ADDENDUM
[FreeTextEntry1] : I Terenegro Jaimes act soley as a scribe for Dr. Richie Steinberg on this date. 02/24/2022

## 2022-02-25 NOTE — PHYSICAL EXAM
[Alert] : alert [Normal Sclera/Conjunctiva] : normal sclera/conjunctiva [Normal Outer Ear/Nose] : the ears and nose were normal in appearance [No Neck Mass] : no neck mass was observed [Thyroid Not Enlarged] : the thyroid was not enlarged [No Thyroid Nodules] : no palpable thyroid nodules [Normal S1, S2] : normal S1 and S2 [No Respiratory Distress] : no respiratory distress [Normal Gait] : normal gait [No Rash] : no rash [Normal Reflexes] : deep tendon reflexes were 2+ and symmetric [Oriented x3] : oriented to person, place, and time [Kyphosis] : kyphosis present [Normal Rate] : heart rate was normal [de-identified] : 3+ pitting LE edema.  [de-identified] : R great toe with fungal nails. DTR reflex in ankle diminished b/l.

## 2022-02-25 NOTE — THERAPY
[Today's Date] : [unfilled] [Humalog] : Humalog [Other:___] : [unfilled] [de-identified] : 10/10/20 u  [FreeTextEntry9] : Semglee 20 u [de-identified] :  Humulin NPH 18 u at 8 PM [FreeTextEntry7] : Atorvastatin 40 mg

## 2022-02-25 NOTE — ASSESSMENT
[Levothyroxine] : The patient was instructed to take Levothyroxine on an empty stomach, separate from vitamins, and wait at least 30 minutes before eating [FreeTextEntry1] : 65 y/o F with:\par \par 1. Longstanding Hx of T1DM with multiple microvascular complications. No known hx of CAD. \par Pt has history of cardiac arrhythmias and HTN, and she sees a cardiologist. Her last cardiac stress test was ~ 3-4 yrs ago. She has been retired since the past summer. \par At present, she is taking 3 different types of insulins.  Patient will prefer to use insulin syringes and no using insulin pen (she said it is more practical )\par We need to reassess her diabetes mellitus, we discussed diabetes treatment goals, diabetes complications prevention, optimization of lifestyle and diet and referral to other specialist.\par Her diabetes needs to be reassessed and update her insulin regimen.\par Labs today\par Sending a prescription for Freestyle Chandrakant\par \par 2. Hypothyroidism:\par Pt appears to be clinically euthyroid. \par Thyroid exam showed no palpable nodules. \par She has a history of HLD. She is on Atorvastatin 40 mg. \par Send in for total cholesterol. \par \par 3. Hx of thyroid nodules. \par Last FNA biopsy was on 08/21/18 and was reported as [place] II.  \par Send for thyroid US. \par \par 4.  Lower extremity edema/chronic\par Saw vascular MD in [place] Dr. Kal Prince \par Referring patients to see Dr. Sandra Durant\par \par Return in: 03/29/22.

## 2022-02-25 NOTE — END OF VISIT
[FreeTextEntry3] : All medical record entries made by the Scribe were at my, Dr. Richie Steinberg, direction and personally dictated by me on 02/24/2022. I have reviewed the chart and agree that the record accurately reflects my personal performance of the history, physical exam, assessment and plan. I have also personally directed, reviewed and agreed with the chart.  [Time Spent: ___ minutes] : I have spent [unfilled] minutes of time on the encounter.

## 2022-02-25 NOTE — HISTORY OF PRESENT ILLNESS
[Finger Stick] : Finger Stick: Yes [Hypoglycemia] : Patient is hypoglycemic. 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 Never Smoker.\par Last podiatrist visit: couple weeks ago (noted 02/24/22). \par Last funduscopic visit: 2 yrs ago for eye floaters.\par Last cardiologist visit: 2 months ago at [facility]. Last cardiac stress test was ~ 3-4 yrs ago. \par \par 06/23/2021\par Pt presents today with POCT 149, /81 and BMI 33.73 for endocrine f/u. FBS around 130 as per pt. \par Pt reportedly experienced severe hypoglycemia two days ago. While preparing her dinner, she went to read text message on her phone in another room, got distracted and forgot that she was making dinner. She fell asleep on the chair and almost slid off the chair when she woke up at 4 AM. Pt states that she "was a mess" and in lot of pain which took 2 days to recover from. \par Pt wants to get her heart checked again. Previously, she was experiencing palpitations, and was informed that there was electrical activity in the L ventricle and she may need to undergo ablation if palpitations continues. However, she has not experienced palpitations since then. \par Pt is retiring on 6/30/21. She will stay here for a while and will decide if she wants to return to [place] or continue to live here. \par \par 02/24/2022\par Review of pt's chart:\par - Last blood test from 06/2021: TSH 0.33, LDL-c 76, A1c 7.0%. \par \par Pt presents today for DM f/u with POCT 277, /76 (remeasured) and BMI 34.06. She gained 13 lbs in 24 months. She retired on 6/30/21 (Note: She will be in [place] from 03/10/22 - 03/21/22). \par Pt is feeling okay, overall. However, she complains of fluctuating BS. She notes that her BS are frequently low because she hasn't been sleeping well and she's started exercising suddenly after being so sedentary. \par Last podiatrist visit was a couple of weeks ago where her R toe fungal nail is being managed. She needs to make an appointment with the ophthalmologist and she has not been seeing her PCP. She also saw a cardiologist 2 months ago at [facility]. \par Pt is taking Lantus 20 u, Humalog 10-12 u during lunch and 18 u of Humalog and NPH for dinner. She no longer wants to use finger sticks and would like to change her insulin to syringes instead of pen-needles. Pt also asked for replacement for Lantus because it costs too much. \par She denies dizziness and lightheadedness. \par \par Current Medications: Humulin NPH 18 u at 8 PM, Semglee 20 u (replaced from Lantus upon pt's request on 02/24/22), Humalog 10/10/20 u ac, Atorvastatin 40 mg, Levothyroxine 110 mcg (decreased on 06/23/21), Fosinopril 20 mg, Verapamil 240 mg (increased 2 yrs ago)\par \par Labs:\par - 06/05/21: A1c 7.0%, s.creat 0.82, LDL-c 76, TSH 0.33, Free T4 1.2, Vit D 25-OH 47\par - 07/31/20: A1c 7.6%, s.creat 0.80, LDL-c 73, Microalb/Creat 7, TSH 0.89, Free T4 1.3\par - 11/16/19: A1c 7.2%, s.creat 0.81, LDL-c 66, Microalb/Creat 9, TSH 0.38, Free T4 1.3\par - 06/22/19: A1c 7.0%, s.creat 0.77, LDL-c 106\par \par Imaging:\par - 11/22/19 BMD: L hip T-score 0.9, Femoral neck 0.1, Spine T-score 0.8.\par - 08/21/18 FNA L mid 1.1 cm nodule: [place] II\par - 03/17/18 Thyroid US: R lobe inferior nodule 1: 1.1 x 1.1 x 0.9 cm solid, nodule 2: 1.1x 1.1 x 1.1 cm solid, nodule 3: 0.7 cm. L lobe mid inferior 1.7 x 1.5 x 0.9 cm solid nodule, nodule 2 inferior 1.3 x 0.9 x 1.1 solid. \par - 11/2012 Thyroid US: L thyroid nodule 1.7 cm, and R 0.5 cm nodule with calcification.

## 2022-02-25 NOTE — REVIEW OF SYSTEMS
[Negative] : Heme/Lymph [As Noted in HPI] : as noted in HPI [Dizziness] : no dizziness [FreeTextEntry2] : She gained 13 lbs in 24 months.  [de-identified] : toe fungus.  [de-identified] : Denies lightheadedness.  [de-identified] : Has not been sleeping well.

## 2022-02-25 NOTE — DATA REVIEWED
[FreeTextEntry1] : Labs:\par - 06/05/21: A1c 7.0%, s.creat 0.82, LDL-c 76, TSH 0.33, Free T4 1.2, Vit D 25-OH 47\par - 07/31/20: A1c 7.6%, s.creat 0.80, LDL-c 73, Microalb/Creat 7, TSH 0.89, Free T4 1.3\par - 11/16/19: A1c 7.2%, s.creat 0.81, LDL-c 66, Microalb/Creat 9, TSH 0.38, Free T4 1.3\par - 06/22/19: A1c 7.0%, s.creat 0.77, LDL-c 106\par \par Imaging:\par - 11/22/19 BMD: L hip T-score 0.9, Femoral neck 0.1, Spine T-score 0.8.\par - 08/21/18 FNA L mid 1.1 cm nodule: [place] II\par - 03/17/18 Thyroid US: R lobe inferior nodule 1: 1.1 x 1.1 x 0.9 cm solid, nodule 2: 1.1x 1.1 x 1.1 cm solid, nodule 3: 0.7 cm. L lobe mid inferior 1.7 x 1.5 x 0.9 cm solid nodule, nodule 2 inferior 1.3 x 0.9 x 1.1 solid. \par - 11/2012 Thyroid US: L thyroid nodule 1.7 cm, and R 0.5 cm nodule with calcification.

## 2022-03-29 ENCOUNTER — APPOINTMENT (OUTPATIENT)
Dept: ENDOCRINOLOGY | Facility: CLINIC | Age: 67
End: 2022-03-29

## 2022-04-11 PROBLEM — Z11.59 SCREENING FOR VIRAL DISEASE: Status: ACTIVE | Noted: 2020-07-24

## 2022-05-03 ENCOUNTER — RX RENEWAL (OUTPATIENT)
Age: 67
End: 2022-05-03

## 2022-05-10 LAB
ALBUMIN SERPL ELPH-MCNC: 4.2 G/DL
ALP BLD-CCNC: 81 U/L
ALT SERPL-CCNC: 16 U/L
ANION GAP SERPL CALC-SCNC: 13 MMOL/L
AST SERPL-CCNC: 17 U/L
BILIRUB SERPL-MCNC: 0.3 MG/DL
BUN SERPL-MCNC: 16 MG/DL
CALCIUM SERPL-MCNC: 10 MG/DL
CHLORIDE SERPL-SCNC: 103 MMOL/L
CHOLEST SERPL-MCNC: 143 MG/DL
CO2 SERPL-SCNC: 23 MMOL/L
CREAT SERPL-MCNC: 0.79 MG/DL
CREAT SPEC-SCNC: 72 MG/DL
ESTIMATED AVERAGE GLUCOSE: 151 MG/DL
GLUCOSE BLDC GLUCOMTR-MCNC: 277
GLUCOSE SERPL-MCNC: 271 MG/DL
HBA1C MFR BLD HPLC: 6.9 %
HDLC SERPL-MCNC: 64 MG/DL
LDLC SERPL CALC-MCNC: 69 MG/DL
MICROALBUMIN 24H UR DL<=1MG/L-MCNC: <1.2 MG/DL
MICROALBUMIN/CREAT 24H UR-RTO: NORMAL MG/G
NONHDLC SERPL-MCNC: 80 MG/DL
POTASSIUM SERPL-SCNC: 4.4 MMOL/L
PROT SERPL-MCNC: 6.7 G/DL
SODIUM SERPL-SCNC: 139 MMOL/L
T4 FREE SERPL-MCNC: 1.4 NG/DL
TRIGL SERPL-MCNC: 51 MG/DL
TSH SERPL-ACNC: 1.07 UIU/ML

## 2022-09-19 ENCOUNTER — NON-APPOINTMENT (OUTPATIENT)
Age: 67
End: 2022-09-19

## 2022-09-19 ENCOUNTER — TRANSCRIPTION ENCOUNTER (OUTPATIENT)
Age: 67
End: 2022-09-19

## 2022-09-19 RX ORDER — FLASH GLUCOSE SCANNING READER
EACH MISCELLANEOUS
Qty: 1 | Refills: 0 | Status: ACTIVE | OUTPATIENT
Start: 2022-09-19

## 2022-09-19 RX ORDER — FLASH GLUCOSE SENSOR
KIT MISCELLANEOUS
Qty: 6 | Refills: 3 | Status: DISCONTINUED | COMMUNITY
Start: 2022-02-24 | End: 2022-09-19

## 2022-09-19 RX ORDER — FLASH GLUCOSE SCANNING READER
EACH MISCELLANEOUS
Qty: 1 | Refills: 0 | Status: DISCONTINUED | COMMUNITY
Start: 2022-02-24 | End: 2022-09-19

## 2022-09-19 RX ORDER — FLASH GLUCOSE SENSOR
KIT MISCELLANEOUS
Qty: 2 | Refills: 3 | Status: DISCONTINUED | COMMUNITY
Start: 2019-11-11 | End: 2022-09-19

## 2022-09-19 RX ORDER — FLASH GLUCOSE SCANNING READER
EACH MISCELLANEOUS
Qty: 1 | Refills: 0 | Status: DISCONTINUED | COMMUNITY
Start: 2019-11-11 | End: 2022-09-19

## 2022-09-20 ENCOUNTER — TRANSCRIPTION ENCOUNTER (OUTPATIENT)
Age: 67
End: 2022-09-20

## 2022-09-28 ENCOUNTER — TRANSCRIPTION ENCOUNTER (OUTPATIENT)
Age: 67
End: 2022-09-28

## 2022-10-02 ENCOUNTER — NON-APPOINTMENT (OUTPATIENT)
Age: 67
End: 2022-10-02

## 2022-10-04 ENCOUNTER — TRANSCRIPTION ENCOUNTER (OUTPATIENT)
Age: 67
End: 2022-10-04

## 2022-10-06 ENCOUNTER — TRANSCRIPTION ENCOUNTER (OUTPATIENT)
Age: 67
End: 2022-10-06

## 2022-10-06 NOTE — HISTORY OF PRESENT ILLNESS
[FreeTextEntry1] : - 11/2012: L thyroid nodule 1.7 cm, and R 0.5 cm nodule with calcification \par - 3/17/18 Thyroid US: R  lobe inferior nodule 1: 1.1 x 1.1 x 0.9 cm solid, nodule 2: 1.1x 1.1 x 1.1 cm solid, nodule 3: 0.7 cm. L lobe mid inferior 1.7 x 1.5 x 0.9 cm solid nodule, nodule 2 inferior 1.3 x 0.9 x 1.1 solid \par - 8/21/18 Thyroid FNA biopsy : L mid 1.1cm [place] II\par - 6/22/19 s. creat 0.77, A1c 7.0%, LDL-c 106\par - 11/16/19: A1c 7.2%, s.creat 0.81, TSH 0.38, Free T4 1.3, TG 40, LDL-c 66, Microalb/Creat Ratio 9, \par - 11/22/19 BMD: L hip T-score 0.9, Femoral neck 0.1, Spine T-score 0.8\par \par 63 y/o F pt, /72, BMI 33.25, with long-standing history of T1DM and Hx of thyroid nodules.\par Other PMHx: R foot fracture\par Podiatrist diagnosed with osteoarthritis and bone union at each great toe\par Last funduscopic visit: Summer 2019, no new dx\par No recent PCP visit\par Saw vascular MD in [place] Dr. Kal Prince\par Angio done in July at [facility]: normal and no surgery needed. Cardiac stress test 2018\par Sees Cardiologist at [facility] for palpitations, next appointment is with electrophysiologist\par \par Today pt presents for endocrine f/u. Pt rarely has episodes of hypoglycemia. \par Pt states she sees a cardiologist every 4 months now and that he told her she still has skipped heart beats. \par She notes she had an angiogram last summer.\par Pt gained 8lbs since last visit. \par \par 11/11/2019 \par Pt presents today for an endocrine f/u, feeling well overall. Pt reports visiting her vascular surgeon for swelling in the legs and knee. Pt reports experiencing hypoglycemia once or twice a month.\par \par 01/16/2020 \par Pt presents today with POCT 173, feeling well with c/o knuckle and LE pain for about 1 year. Pt reports developing ankle pain which spreads to her knee and to her calf. She has never followed up with a rheumatologist.\par Pt reports that her BS is stable for 1.5 months with 1-2 hypoglycemic episodes and high BS. Christopher SOB and CP.\par In past 4-6 months, pt reports calling out wrong names of people she knows she knows despite knowing their correct name. She reportedly only notices the mistake after she has called out the name. She mentions that she has become somewhat forgetful and has trouble calling out words that she is thinking of. She is not sure if this occurs when she is reading. \par \par Meds: Humalog 10/10/18 u, Humulin at night 24u, NPH 24 u at Hs , Lantus 26u QD, Atorvastatin 40mg, Levothyroxine 112mcg, Fosinopril 20mg,
4 = No assist / stand by assistance

## 2022-10-07 ENCOUNTER — RX RENEWAL (OUTPATIENT)
Age: 67
End: 2022-10-07

## 2022-10-12 ENCOUNTER — APPOINTMENT (OUTPATIENT)
Dept: ENDOCRINOLOGY | Facility: CLINIC | Age: 67
End: 2022-10-12

## 2022-10-12 VITALS
SYSTOLIC BLOOD PRESSURE: 129 MMHG | BODY MASS INDEX: 33.9 KG/M2 | HEART RATE: 94 BPM | WEIGHT: 210 LBS | DIASTOLIC BLOOD PRESSURE: 65 MMHG

## 2022-10-12 LAB — GLUCOSE BLDC GLUCOMTR-MCNC: 77

## 2022-10-12 PROCEDURE — 82962 GLUCOSE BLOOD TEST: CPT

## 2022-10-12 PROCEDURE — 99214 OFFICE O/P EST MOD 30 MIN: CPT | Mod: 25

## 2022-10-12 RX ORDER — INSULIN HUMAN 100 [IU]/ML
100 INJECTION, SUSPENSION SUBCUTANEOUS
Qty: 10 | Refills: 1 | Status: COMPLETED | COMMUNITY
Start: 2017-06-26 | End: 2022-10-12

## 2022-10-12 RX ORDER — INSULIN LISPRO 100 [IU]/ML
100 INJECTION, SOLUTION INTRAVENOUS; SUBCUTANEOUS
Qty: 3 | Refills: 2 | Status: DISCONTINUED | COMMUNITY
Start: 2017-09-21 | End: 2022-10-12

## 2022-10-12 RX ORDER — FOSINOPRIL SODIUM 20 MG/1
20 TABLET ORAL DAILY
Qty: 90 | Refills: 1 | Status: COMPLETED | COMMUNITY
Start: 2017-12-06 | End: 2022-10-12

## 2022-10-12 RX ORDER — INSULIN LISPRO 100 [IU]/ML
100 INJECTION, SOLUTION INTRAVENOUS; SUBCUTANEOUS
Qty: 3 | Refills: 3 | Status: COMPLETED | COMMUNITY
Start: 2018-06-28 | End: 2022-10-12

## 2022-10-12 RX ORDER — INSULIN GLARGINE 100 [IU]/ML
100 INJECTION, SOLUTION SUBCUTANEOUS
Qty: 3 | Refills: 2 | Status: COMPLETED | COMMUNITY
Start: 2018-06-28 | End: 2022-10-12

## 2022-10-12 RX ORDER — INSULIN LISPRO 100 [IU]/ML
100 INJECTION, SOLUTION INTRAVENOUS; SUBCUTANEOUS
Qty: 2 | Refills: 0 | Status: COMPLETED | COMMUNITY
Start: 2022-02-24 | End: 2022-10-12

## 2022-10-12 RX ORDER — INSULIN GLULISINE 100 [IU]/ML
100 INJECTION, SOLUTION SUBCUTANEOUS
Qty: 1 | Refills: 6 | Status: DISCONTINUED | COMMUNITY
Start: 2017-08-15 | End: 2022-10-12

## 2022-10-12 RX ORDER — INSULIN GLARGINE 100 [IU]/ML
100 INJECTION, SOLUTION SUBCUTANEOUS
Qty: 2 | Refills: 2 | Status: COMPLETED | COMMUNITY
Start: 2022-02-24 | End: 2022-10-12

## 2022-10-12 RX ORDER — DULOXETINE HYDROCHLORIDE 60 MG/1
60 CAPSULE, DELAYED RELEASE PELLETS ORAL
Qty: 30 | Refills: 1 | Status: COMPLETED | COMMUNITY
Start: 2020-01-22 | End: 2022-10-12

## 2022-10-14 NOTE — HISTORY OF PRESENT ILLNESS
[Finger Stick] : Finger Stick: Yes [Hypoglycemia] : Patient is hypoglycemic. 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 Never Smoker.\par Last podiatrist visit: couple weeks ago (noted 02/24/22)- managed R fungal nail. \par Last funduscopic visit: 2 yrs ago for eye floaters.\par Last cardiologist visit: 2 months ago at [facility]. Last cardiac stress test was ~ 3-4 yrs ago. \par \par Review of pt's chart:\par - 02/2022 10-yr ASCVD risk calculation: 7.53%\par \par 10/12/2022\par Pt has POCT 77, /65 and BMI 33.9. No significant weight changes since last visit in 02/24/22. \par Today, pt is feeling worried of hypoglycemias that occurs at any time; but usually at night. ~ 2 weeks ago, pt passed out from hypoglycemia. \par Pt makes effort to lose weight via diet. Does not exercise due to hip and R knee pain. Great difficulty going downstairs and ambulating (uses cane). Has appointment in 1 month with physiatrist/orthopaedic surgeon.\par Last ophthalmologist visit was 3 months ago. \par \par Current Medications: Humulin NPH 20 u at 8 PM, Lantus 20 u qam, Humalog 10-12 u ac, Atorvastatin 40 mg, Levothyroxine 110 mcg (decreased on 06/23/21), Fosinopril 20 mg, Verapamil 240 mg (increased 2 yrs ago)\par

## 2022-10-14 NOTE — PHYSICAL EXAM
[Alert] : alert [Normal Sclera/Conjunctiva] : normal sclera/conjunctiva [Normal Outer Ear/Nose] : the ears and nose were normal in appearance [No Neck Mass] : no neck mass was observed [Thyroid Not Enlarged] : the thyroid was not enlarged [No Thyroid Nodules] : no palpable thyroid nodules [No Respiratory Distress] : no respiratory distress [Normal S1, S2] : normal S1 and S2 [Normal Rate] : heart rate was normal [Kyphosis] : kyphosis present [No Rash] : no rash [Normal Reflexes] : deep tendon reflexes were 2+ and symmetric [Oriented x3] : oriented to person, place, and time [de-identified] : 3+ pitting LE edema.  [de-identified] : Walks with cane.  [de-identified] : R great toe with fungal nails. DTR reflex in ankle diminished b/l.

## 2022-10-14 NOTE — THERAPY
[Today's Date] : [unfilled] [Other:___] : [unfilled] [Humalog] : Humalog [FreeTextEntry9] : Semglee 20 u [de-identified] : 10/10/20 u  [de-identified] :  Humulin NPH 18 u at 8 PM [FreeTextEntry7] : Atorvastatin 40 mg

## 2022-10-14 NOTE — DATA REVIEWED
[FreeTextEntry1] : Labs: - 06/05/21: A1c 7.0%, s.creat 0.82, LDL-c 76, TSH 0.33, Free T4 1.2, Vit D 25-OH 47 - 07/31/20: A1c 7.6%, s.creat 0.80, LDL-c 73, Microalb/Creat 7, TSH 0.89, Free T4 1.3 - 11/16/19: A1c 7.2%, s.creat 0.81, LDL-c 66, Microalb/Creat 9, TSH 0.38, Free T4 1.3 - 06/22/19: A1c 7.0%, s.creat 0.77, LDL-c 106\par   \par Imaging:\par  - 11/22/19 BMD: L hip T-score 0.9, Femoral neck 0.1, Spine T-score 0.8.\par  - 08/21/18 FNA L mid 1.1 cm nodule: [place] II - 03/17/18 Thyroid US: R lobe inferior nodule 1: 1.1 x 1.1 x 0.9 cm solid, nodule 2: 1.1x 1.1 x 1.1 cm solid, nodule 3: 0.7 cm. L lobe mid inferior 1.7 x 1.5 x 0.9 cm solid nodule, nodule 2 inferior 1.3 x 0.9 x 1.1 solid.  - 11/2012 Thyroid US: L thyroid nodule 1.7 cm, and R 0.5 cm nodule with calcification.

## 2022-10-14 NOTE — ADDENDUM
[FreeTextEntry1] : I Terenegro Jaimes act soley as a scribe for Dr. Richie Steinberg on this date. 10/12/2022

## 2022-10-14 NOTE — END OF VISIT
[FreeTextEntry3] : All medical record entries made by the Scribe were at my, Dr. Richie Steinberg, direction and personally dictated by me on 10/12/2022. I have reviewed the chart and agree that the record accurately reflects my personal performance of the history, physical exam, assessment and plan. I have also personally directed, reviewed and agreed with the chart.  [Time Spent: ___ minutes] : I have spent [unfilled] minutes of time on the encounter.

## 2022-10-14 NOTE — ASSESSMENT
[Carbohydrate Consistent Diet] : carbohydrate consistent diet [Importance of Diet and Exercise] : importance of diet and exercise to improve glycemic control, achieve weight loss and improve cardiovascular health [Hypoglycemia Management] : hypoglycemia management [Action and use of Insulin] : action and use of short and long-acting insulin [Self Monitoring of Blood Glucose] : self monitoring of blood glucose

## 2022-10-14 NOTE — REVIEW OF SYSTEMS
[Hip Pain] : hip pain [Knee Pain] : knee pain [As Noted in HPI] : as noted in HPI [Difficulty Walking] : difficulty walking [Fainting] : fainting [Negative] : Psychiatric [Recent Weight Gain (___ Lbs)] : no recent weight gain [Recent Weight Loss (___ Lbs)] : no recent weight loss [de-identified] : toe fungus.  [de-identified] : Fainted due to hypoglycemia.

## 2022-10-17 ENCOUNTER — TRANSCRIPTION ENCOUNTER (OUTPATIENT)
Age: 67
End: 2022-10-17

## 2022-10-18 ENCOUNTER — TRANSCRIPTION ENCOUNTER (OUTPATIENT)
Age: 67
End: 2022-10-18

## 2022-10-19 ENCOUNTER — TRANSCRIPTION ENCOUNTER (OUTPATIENT)
Age: 67
End: 2022-10-19

## 2022-10-19 LAB
ALBUMIN SERPL ELPH-MCNC: 4.3 G/DL
ALP BLD-CCNC: 84 U/L
ALT SERPL-CCNC: 25 U/L
ANION GAP SERPL CALC-SCNC: 11 MMOL/L
AST SERPL-CCNC: 26 U/L
BILIRUB SERPL-MCNC: 0.3 MG/DL
BUN SERPL-MCNC: 18 MG/DL
CALCIUM SERPL-MCNC: 9.7 MG/DL
CHLORIDE SERPL-SCNC: 103 MMOL/L
CHOLEST SERPL-MCNC: 169 MG/DL
CO2 SERPL-SCNC: 25 MMOL/L
CREAT SERPL-MCNC: 0.84 MG/DL
CREAT SPEC-SCNC: 151 MG/DL
EGFR: 77 ML/MIN/1.73M2
ESTIMATED AVERAGE GLUCOSE: 140 MG/DL
GLUCOSE SERPL-MCNC: 59 MG/DL
HBA1C MFR BLD HPLC: 6.5 %
HDLC SERPL-MCNC: 68 MG/DL
LDLC SERPL CALC-MCNC: 88 MG/DL
MICROALBUMIN 24H UR DL<=1MG/L-MCNC: 2.4 MG/DL
MICROALBUMIN/CREAT 24H UR-RTO: 16 MG/G
NONHDLC SERPL-MCNC: 101 MG/DL
POTASSIUM SERPL-SCNC: 4.4 MMOL/L
PROT SERPL-MCNC: 7.1 G/DL
SODIUM SERPL-SCNC: 139 MMOL/L
TRIGL SERPL-MCNC: 67 MG/DL
TSH SERPL-ACNC: 1.22 UIU/ML

## 2022-10-24 ENCOUNTER — TRANSCRIPTION ENCOUNTER (OUTPATIENT)
Age: 67
End: 2022-10-24

## 2022-11-02 ENCOUNTER — TRANSCRIPTION ENCOUNTER (OUTPATIENT)
Age: 67
End: 2022-11-02

## 2022-11-04 ENCOUNTER — TRANSCRIPTION ENCOUNTER (OUTPATIENT)
Age: 67
End: 2022-11-04

## 2022-11-08 ENCOUNTER — OUTPATIENT (OUTPATIENT)
Dept: OUTPATIENT SERVICES | Facility: HOSPITAL | Age: 67
LOS: 1 days | End: 2022-11-08

## 2022-11-08 ENCOUNTER — APPOINTMENT (OUTPATIENT)
Dept: ULTRASOUND IMAGING | Facility: CLINIC | Age: 67
End: 2022-11-08

## 2022-11-08 ENCOUNTER — RESULT REVIEW (OUTPATIENT)
Age: 67
End: 2022-11-08

## 2022-11-08 PROCEDURE — 76536 US EXAM OF HEAD AND NECK: CPT | Mod: 26

## 2022-11-14 ENCOUNTER — TRANSCRIPTION ENCOUNTER (OUTPATIENT)
Age: 67
End: 2022-11-14

## 2022-12-13 ENCOUNTER — APPOINTMENT (OUTPATIENT)
Dept: ENDOCRINOLOGY | Facility: CLINIC | Age: 67
End: 2022-12-13

## 2022-12-13 ENCOUNTER — RESULT REVIEW (OUTPATIENT)
Age: 67
End: 2022-12-13

## 2022-12-13 VITALS
DIASTOLIC BLOOD PRESSURE: 66 MMHG | HEART RATE: 86 BPM | BODY MASS INDEX: 33.57 KG/M2 | WEIGHT: 208 LBS | SYSTOLIC BLOOD PRESSURE: 160 MMHG

## 2022-12-13 PROCEDURE — 99214 OFFICE O/P EST MOD 30 MIN: CPT | Mod: 25

## 2022-12-13 PROCEDURE — 10005 FNA BX W/US GDN 1ST LES: CPT

## 2022-12-13 PROCEDURE — 10006 FNA BX W/US GDN EA ADDL: CPT

## 2022-12-16 NOTE — PROCEDURE
[Fine Needle Aspiration] : Fine needle aspiration ~T ~C was performed. [Risks] : risks [Benefits] : benefits [Alternatives] : alternatives [Consent Obtained] : Written consent was obtained prior to the procedure and is detailed in the patient's record [Patient] : the patient [Ethyl Chloride] : ethyl chloride [Supine] : The patient was placed in the supine position with the neck extended as tolerated. [Alcohol] : with alcohol [25 gauge 1.5 inch] : A 25 gauge 1.5 inch needle was used [3 Passes] : 3 passes were made through the mass [Ultrasonic Guidance] : ultrasound guidance was employed [Sent to Histology] : The specimens were prepared in the usual manner and sent to histology. [Afirma] : Afirma [Tolerated Well] : the patient tolerated the procedure well [Vital Signs Stable] : the vital signs were stable [Hemostasis] : hemostasis was assured and the patient was discharged in satisfactory condition [No Complications] : There were no complications [Instructions Given] : Handouts/patient instructions were given to patient [PRN] : as needed. [de-identified] : L and R thyroid lobe nodules

## 2022-12-16 NOTE — ASSESSMENT
[FreeTextEntry1] : Thyroid nodule:\par \par Right lowerer lobe nodule measuring 1.6 x 1.3 x 1.5 cm was successfully biopsied under sonographic guidance. An extra sample for PRN Afirma testing was obtained if indeterminate results are present.\par \par Left mid lobe nodule measuring 2.1 x 1.8 x 2 cm was successfully biopsied under sonographic guidance. An extra sample for PRN Afirma testing was obtained if indeterminate results are present. r/b/a to thyroid biopsy, management of thyroid nodules reviewed. Verbalized understanding and agrees with treatment plan, will contact MD and seek emergency medical care if condition changes.\par

## 2023-01-21 ENCOUNTER — TRANSCRIPTION ENCOUNTER (OUTPATIENT)
Age: 68
End: 2023-01-21

## 2023-01-30 ENCOUNTER — TRANSCRIPTION ENCOUNTER (OUTPATIENT)
Age: 68
End: 2023-01-30

## 2023-03-07 ENCOUNTER — APPOINTMENT (OUTPATIENT)
Dept: ENDOCRINOLOGY | Facility: CLINIC | Age: 68
End: 2023-03-07
Payer: MEDICARE

## 2023-03-07 VITALS
HEART RATE: 97 BPM | BODY MASS INDEX: 32.93 KG/M2 | SYSTOLIC BLOOD PRESSURE: 132 MMHG | DIASTOLIC BLOOD PRESSURE: 69 MMHG | WEIGHT: 204 LBS

## 2023-03-07 PROCEDURE — 83036 HEMOGLOBIN GLYCOSYLATED A1C: CPT | Mod: QW

## 2023-03-07 PROCEDURE — 99215 OFFICE O/P EST HI 40 MIN: CPT | Mod: 25

## 2023-03-07 PROCEDURE — 82962 GLUCOSE BLOOD TEST: CPT

## 2023-03-08 LAB
ALBUMIN SERPL ELPH-MCNC: 3.9 G/DL
ALP BLD-CCNC: 86 U/L
ALT SERPL-CCNC: 19 U/L
ANION GAP SERPL CALC-SCNC: 10 MMOL/L
AST SERPL-CCNC: 19 U/L
BASOPHILS # BLD AUTO: 0.06 K/UL
BASOPHILS NFR BLD AUTO: 0.8 %
BILIRUB SERPL-MCNC: 0.4 MG/DL
BUN SERPL-MCNC: 24 MG/DL
CALCIUM SERPL-MCNC: 9.7 MG/DL
CHLORIDE SERPL-SCNC: 100 MMOL/L
CHOLEST SERPL-MCNC: 148 MG/DL
CO2 SERPL-SCNC: 24 MMOL/L
CREAT SERPL-MCNC: 0.84 MG/DL
CREAT SPEC-SCNC: 38 MG/DL
EGFR: 77 ML/MIN/1.73M2
EOSINOPHIL # BLD AUTO: 0.16 K/UL
EOSINOPHIL NFR BLD AUTO: 2.1 %
ESTIMATED AVERAGE GLUCOSE: 151 MG/DL
GLUCOSE BLDC GLUCOMTR-MCNC: 522
GLUCOSE SERPL-MCNC: 541 MG/DL
HBA1C MFR BLD HPLC: 6.9
HBA1C MFR BLD HPLC: 6.9 %
HCT VFR BLD CALC: 37.3 %
HDLC SERPL-MCNC: 59 MG/DL
HGB BLD-MCNC: 11.6 G/DL
IMM GRANULOCYTES NFR BLD AUTO: 0.3 %
LDLC SERPL CALC-MCNC: 81 MG/DL
LYMPHOCYTES # BLD AUTO: 1.31 K/UL
LYMPHOCYTES NFR BLD AUTO: 17.5 %
MAN DIFF?: NORMAL
MCHC RBC-ENTMCNC: 28.4 PG
MCHC RBC-ENTMCNC: 31.1 GM/DL
MCV RBC AUTO: 91.4 FL
MICROALBUMIN 24H UR DL<=1MG/L-MCNC: <1.2 MG/DL
MICROALBUMIN/CREAT 24H UR-RTO: NORMAL MG/G
MONOCYTES # BLD AUTO: 0.45 K/UL
MONOCYTES NFR BLD AUTO: 6 %
NEUTROPHILS # BLD AUTO: 5.48 K/UL
NEUTROPHILS NFR BLD AUTO: 73.3 %
NONHDLC SERPL-MCNC: 90 MG/DL
PLATELET # BLD AUTO: 235 K/UL
POTASSIUM SERPL-SCNC: 4.9 MMOL/L
PROT SERPL-MCNC: 6.7 G/DL
RBC # BLD: 4.08 M/UL
RBC # FLD: 13.8 %
SODIUM SERPL-SCNC: 135 MMOL/L
TRIGL SERPL-MCNC: 41 MG/DL
TSH SERPL-ACNC: 1.04 UIU/ML
WBC # FLD AUTO: 7.48 K/UL

## 2023-03-09 NOTE — DATA REVIEWED
[FreeTextEntry1] : Labs: - 06/05/21: A1c 7.0%, s.creat 0.82, LDL-c 76, TSH 0.33, Free T4 1.2, Vit D 25-OH 47 - 07/31/20: A1c 7.6%, s.creat 0.80, LDL-c 73, Microalb/Creat 7, TSH 0.89, Free T4 1.3 - 11/16/19: A1c 7.2%, s.creat 0.81, LDL-c 66, Microalb/Creat 9, TSH 0.38, Free T4 1.3 - 06/22/19: A1c 7.0%, s.creat 0.77, LDL-c 106\par   \par Imaging:\par  - 11/22/19 BMD: L hip T-score 0.9, Femoral neck 0.1, Spine T-score 0.8.\par  - 08/21/18 FNA L mid 1.1 cm nodule: [place] II - 03/17/18 Thyroid US: R lobe inferior nodule 1: 1.1 x 1.1 x 0.9 cm solid, nodule 2: 1.1x 1.1 x 1.1 cm solid, nodule 3: 0.7 cm. L lobe mid inferior 1.7 x 1.5 x 0.9 cm solid nodule, nodule 2 inferior 1.3 x 0.9 x 1.1 solid.  - 11/2012 Thyroid US: L thyroid nodule 1.7 cm, and R 0.5 cm nodule with calcification.

## 2023-03-09 NOTE — ADDENDUM
[FreeTextEntry1] : I, Viv Cortes, acted solely as a scribe for Dr. Richie Steinberg on this date 03/07/2023

## 2023-03-09 NOTE — END OF VISIT
[Time Spent: ___ minutes] : I have spent [unfilled] minutes of time on the encounter. [FreeTextEntry3] : All medical record entries made by the Scribe were at my, Dr. Richie Steinberg, direction and personally dictated by me on 03/07/2023. I have reviewed the chart and agree that the record accurately reflects my personal performance of the history, physical exam, assessment and plan. I have also personally, directed, reviewed and agreed with the chart

## 2023-03-09 NOTE — PHYSICAL EXAM
[Alert] : alert [Normal Sclera/Conjunctiva] : normal sclera/conjunctiva [Normal Outer Ear/Nose] : the ears and nose were normal in appearance [No Neck Mass] : no neck mass was observed [Thyroid Not Enlarged] : the thyroid was not enlarged [No Thyroid Nodules] : no palpable thyroid nodules [No Respiratory Distress] : no respiratory distress [Normal S1, S2] : normal S1 and S2 [Normal Rate] : heart rate was normal [Kyphosis] : kyphosis present [No Rash] : no rash [Normal Reflexes] : deep tendon reflexes were 2+ and symmetric [Oriented x3] : oriented to person, place, and time [de-identified] : 3+ pitting LE edema.  [de-identified] : Walks with cane.  [de-identified] : R great toe with fungal nails. DTR reflex in ankle diminished b/l.

## 2023-03-09 NOTE — REVIEW OF SYSTEMS
[Hip Pain] : hip pain [Knee Pain] : knee pain [As Noted in HPI] : as noted in HPI [Difficulty Walking] : difficulty walking [Fainting] : fainting [Negative] : Heme/Lymph [Recent Weight Gain (___ Lbs)] : no recent weight gain [Recent Weight Loss (___ Lbs)] : no recent weight loss [de-identified] : toe fungus.  [de-identified] : Fainted due to hypoglycemia.

## 2023-03-09 NOTE — ASSESSMENT
[Carbohydrate Consistent Diet] : carbohydrate consistent diet [Importance of Diet and Exercise] : importance of diet and exercise to improve glycemic control, achieve weight loss and improve cardiovascular health [Hypoglycemia Management] : hypoglycemia management [Action and use of Insulin] : action and use of short and long-acting insulin [Self Monitoring of Blood Glucose] : self monitoring of blood glucose [Exercise/Effect on Glucose] : exercise/effect on glucose

## 2023-03-09 NOTE — HISTORY OF PRESENT ILLNESS
[Finger Stick] : Finger Stick: Yes [Hypoglycemia] : Patient is hypoglycemic. 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 Never Smoker.\par Last podiatrist visit: couple weeks ago (noted 02/24/22)- managed R fungal nail. \par Last funduscopic visit: 2 yrs ago for eye floaters.\par Last cardiologist visit: 2 months ago at [facility]. Last cardiac stress test was ~ 3-4 yrs ago. \par \par Review of pt's chart:\par - 02/2022 10-yr ASCVD risk calculation: 7.53%\par \par 10/12/2022\par Pt has POCT 77, /65 and BMI 33.9. No significant weight changes since last visit in 02/24/22. \par Today, pt is feeling worried of hypoglycemias that occurs at any time; but usually at night. ~ 2 weeks ago, pt passed out from hypoglycemia. \par Pt makes effort to lose weight via diet. Does not exercise due to hip and R knee pain. Great difficulty going downstairs and ambulating (uses cane). Has appointment in 1 month with physiatrist/orthopaedic surgeon.\par Last ophthalmologist visit was 3 months ago.  \par \par 03/07/2023\par Pt presents today for DM f/u with POCT 522 (did not take insulin today), /69 and BMI 32.93. She lost 4 lbs in 3 months. \par Today, pt is feeling well with no physical complaints.  Pt states her BS is PPBS below 200. Has seen ophthalmologist recently: no new diagnosis.  \par She states she has returned from traveling ~ 2 weeks ago. Denies fever, excessive urination and GI disturbances.\par \par [DM Medications verified on 03/07/2023 as per pt] \par Current Medications: NPH to 17 u qd and Lantus to 17 u qd , Humalog 10-12 u ac, Atorvastatin 40 mg, Levothyroxine 110 mcg (decreased on 06/23/21), Fosinopril 20 mg, Verapamil 240 mg (increased 2 yrs ago)\par \par

## 2023-03-09 NOTE — THERAPY
[Today's Date] : [unfilled] [Other:___] : [unfilled] [Humalog] : Humalog [FreeTextEntry9] : Semglee 20 u [de-identified] : 10/10/20 u  [de-identified] :  Humulin NPH 18 u at 8 PM [FreeTextEntry7] : Atorvastatin 40 mg

## 2023-04-09 ENCOUNTER — NON-APPOINTMENT (OUTPATIENT)
Age: 68
End: 2023-04-09

## 2023-06-06 ENCOUNTER — NON-APPOINTMENT (OUTPATIENT)
Age: 68
End: 2023-06-06

## 2023-06-24 ENCOUNTER — TRANSCRIPTION ENCOUNTER (OUTPATIENT)
Age: 68
End: 2023-06-24

## 2023-07-05 ENCOUNTER — APPOINTMENT (OUTPATIENT)
Dept: ENDOCRINOLOGY | Facility: CLINIC | Age: 68
End: 2023-07-05
Payer: MEDICARE

## 2023-07-05 VITALS
SYSTOLIC BLOOD PRESSURE: 123 MMHG | HEART RATE: 90 BPM | DIASTOLIC BLOOD PRESSURE: 68 MMHG | BODY MASS INDEX: 32.12 KG/M2 | WEIGHT: 199 LBS

## 2023-07-05 DIAGNOSIS — M85.80 OTHER SPECIFIED DISORDERS OF BONE DENSITY AND STRUCTURE, UNSPECIFIED SITE: ICD-10-CM

## 2023-07-05 PROCEDURE — 99215 OFFICE O/P EST HI 40 MIN: CPT | Mod: 25

## 2023-07-05 PROCEDURE — 82962 GLUCOSE BLOOD TEST: CPT

## 2023-07-05 NOTE — ASSESSMENT
[Carbohydrate Consistent Diet] : carbohydrate consistent diet [Importance of Diet and Exercise] : importance of diet and exercise to improve glycemic control, achieve weight loss and improve cardiovascular health [Exercise/Effect on Glucose] : exercise/effect on glucose [Hypoglycemia Management] : hypoglycemia management [Self Monitoring of Blood Glucose] : self monitoring of blood glucose [Injection Technique, Storage, Sharps Disposal] : injection technique, storage, and sharps disposal [Weight Loss] : weight loss [Levothyroxine] : The patient was instructed to take Levothyroxine on an empty stomach, separate from vitamins, and wait at least 30 minutes before eating

## 2023-07-06 NOTE — RESULTS/DATA
[Hologic] : hologic [T-Score ___] : T-score: [unfilled] [Major Osteoporotic Fracture (%): ___] : Major Osteoporotic Fracture (%):[unfilled] [Hip Fracture (%):___] : Hip Fracture (%): [unfilled] [FreeTextEntry2] : 11/08/2022 [FreeTextEntry1] : Impression: According to the WHO classification, the patient has low bone mass (osteopenia) in the left hip and left forearm. \par  [de-identified] : IMPRESSION:\par Normal bone density in the left hip and lumbar spine. [FreeTextEntry4] : 11/22/2019

## 2023-07-06 NOTE — REVIEW OF SYSTEMS
[Hip Pain] : hip pain [Knee Pain] : knee pain [As Noted in HPI] : as noted in HPI [Difficulty Walking] : difficulty walking [Fainting] : fainting [Negative] : Heme/Lymph [Recent Weight Gain (___ Lbs)] : no recent weight gain [Recent Weight Loss (___ Lbs)] : no recent weight loss [de-identified] : toe fungus.  [de-identified] : Fainted due to hypoglycemia.

## 2023-07-06 NOTE — HISTORY OF PRESENT ILLNESS
[Finger Stick] : Finger Stick: Yes [Hypoglycemia] : Patient is hypoglycemic. [FreeTextEntry1] : 69 y/o F pt, with Hx of T1DM (dx. ~2002), Hx of hypothyroidism, and Hx of thyroid nodules.\par Other PMHx: Osteopenia (noted in BMD 11/08/22), R foot fracture, Osteoarthritis and bone union on b/l great toes (dx by podiatrist), HTN\par SHx: Retired on 6/30/21. Never Smoker.\par Last podiatrist visit: couple weeks ago (noted 02/24/22)- managed R fungal nail. \par Last funduscopic visit: 2023\par Last cardiologist visit: 2 months ago at [facility]. Last cardiac stress test was ~ 3-4 yrs ago. \par \par Review of pt's chart:\par - 02/2022 10-yr ASCVD risk calculation: 7.53%\par \par 03/07/2023\par Pt presents today for DM f/u with POCT 522 (did not take insulin today), /69 and BMI 32.93. She lost 4 lbs in 3 months. \par Today, pt is feeling well with no physical complaints.  Pt states her BS is PPBS below 200. Has seen ophthalmologist recently: no new diagnosis.  \par She states she has returned from traveling ~ 2 weeks ago. Denies fever, excessive urination and GI disturbances.\par \par 07/05/2023\par Pt has POCT 128, /68  and BMI 32.12. She lost 5 lbs in 4 months. \par CC: "I'm doing okay."\par Pt states her BS levels have been fluctuating a bit. She experienced hypoglycemic episode last night from 2-5 am. She endorses daytime hypoglycemias several times per week. As per pt she takes insulin with her meals "by her gut". She does not have a sliding scale.\par Overview of FBS on pt's phone: average 120. \par She will see cardiologist later this month. She saw ophthalmologist recently, unremarkable as per pt. \par Pt sates she has been sleeping past breakfast time. Denies breathing difficulties, dysphagia, and dysphonia. \par \par [Medications verified as per pt on 07/05/2023)\par Current Medications: NPH 16-18 u qpm, Lantus 16 u qam , Humalog 9/10/15-16 u ac, Atorvastatin 40 mg, Levothyroxine 110 mcg (decreased on 06/23/21), Fosinopril 20 mg, Verapamil 240 mg (increased 2 yrs ago). \par \par Medication modified/added this visit: NPH 16 u qpm (decreased from 16-18 u qpm 07/05/2023), Humalog 6/6/8 u +ss (decreased from 9/10/15-16 u ac; see details in Diabetes Therapy Regimen)

## 2023-07-06 NOTE — DATA REVIEWED
[FreeTextEntry1] : Scanned Labs:  \par - 12/13/22 Thyroid FNA: R Lower Pole 1.6 cm [place] II, L Midpole 2.1 cm [place] III and Afirma benign.\par - 06/05/21: A1c 7.0%, s.creat 0.82, LDL-c 76, TSH 0.33, Free T4 1.2, Vit D 25-OH 47\par  - 07/31/20: A1c 7.6%, s.creat 0.80, LDL-c 73, Microalb/Creat 7, TSH 0.89, Free T4 1.3\par  - 11/16/19: A1c 7.2%, s.creat 0.81, LDL-c 66, Microalb/Creat 9, TSH 0.38, Free T4 1.3\par  - 06/22/19: A1c 7.0%, s.creat 0.77, LDL-c 106\par   \par Imaging:\par - 11/08/22 Thyroid US: Findings:\par RIGHT LOBE:\par Dimensions: 4.3 x 2.1 x 1.7 cm (sagittal x AP x transverse). Previously measured 3.8 x 1.9 x 1.7 cm.\par Echotexture: Heterogeneous. Vascularity: Normal. The right lobe contains three nodules\par Nodule 1:\par Location: Midpole. Dimensions: 1.5 x 0.9 x 1.1 cm (sagittal x AP x transverse). Previously 1.4 x 1.0 x 1.1 cm. Composition: Solid, echogenic.\par Nodule 2:\par Location: Mid-lower pole. Dimensions: 1.0 x 0.8 x 0.7 cm (sagittal x AP x transverse). Previously measured 0.7 x 0.6 x 0.6 cm. Composition: Spongiform.\par Nodule 3:\par Location: Lower pole. Dimensions: 1.6 x 1.3 x 1.5 cm (sagittal x AP x transverse). Previously measured 1.1 x 1.1 x 0.9 cm. Composition: Mostly solid with central small cystic component.\par LEFT LOBE:\par Dimensions: 3.9 x 1.8 x 1.7 cm (sagittal x AP x transverse). Previously measured 4.1 x 1.8 x 1.3 cm.\par Echotexture: Homogeneous. Vascularity: Normal. The left lobe contains one nodule\par Nodule 1:\par Location: Mid to lower pole. Dimensions: 2.1 x 1.8 x 2.0 cm (sagittal x AP x transverse). Previously measured 1.7 x 1.5 x 0.9 cm. Composition: Mostly solid and echogenic with a few small cystic areas.\par ISTHMUS: Dimensions: 0.2 cm AP. The isthmus lobe contains no visible nodules.\par Cervical Lymph Node Evaluation: No abnormal lymph nodes are identified in the neck.\par Parathyroid Examination: Parathyroid glands not visualized.\par IMPRESSION:\par Since 3/17/2018, there has been an increase in the size of three right-sided thyroid nodules and one left-sided thyroid nodule. All of the nodules meet criteria for biopsy.\par - 11/22/19 BMD: L hip T-score 0.9, Femoral neck 0.1, Spine T-score 0.8.\par  - 08/21/18 FNA L mid 1.1 cm nodule: [place] II - 03/17/18 Thyroid US: R lobe inferior nodule 1: 1.1 x 1.1 x 0.9 cm solid, nodule 2: 1.1x 1.1 x 1.1 cm solid, nodule 3: 0.7 cm. L lobe mid inferior 1.7 x 1.5 x 0.9 cm solid nodule, nodule 2 inferior 1.3 x 0.9 x 1.1 solid.  - 11/2012 Thyroid US: L thyroid nodule 1.7 cm, and R 0.5 cm nodule with calcification.

## 2023-07-06 NOTE — PHYSICAL EXAM
[Alert] : alert [Normal Sclera/Conjunctiva] : normal sclera/conjunctiva [Normal Outer Ear/Nose] : the ears and nose were normal in appearance [No Neck Mass] : no neck mass was observed [Thyroid Not Enlarged] : the thyroid was not enlarged [No Thyroid Nodules] : no palpable thyroid nodules [No Respiratory Distress] : no respiratory distress [Normal S1, S2] : normal S1 and S2 [Normal Rate] : heart rate was normal [Kyphosis] : kyphosis present [No Rash] : no rash [Normal Reflexes] : deep tendon reflexes were 2+ and symmetric [Oriented x3] : oriented to person, place, and time [de-identified] : 3+ pitting LE edema.  [de-identified] : R great toe with fungal nails. DTR reflex in ankle diminished b/l.  [de-identified] : Walks with cane.

## 2023-07-06 NOTE — ADDENDUM
[FreeTextEntry1] : I, Radha Anderson act soley as a scribe for Dr. Richie Steinberg on this date. 07/05/2023\par

## 2023-07-06 NOTE — THERAPY
[Today's Date] : [unfilled] [Humalog] : Humalog [Lantus] : Lantus [FreeTextEntry9] : 16 u qam [de-identified] : 6/6/8 u ac +ss: For glucose readings less than 100: no additional insulin.\par For glucose between: \par - 101-150: +2 u, \par - 151-200: +4 u, \par - 201-250: +5 u, \par - 251-300: +6 u, \par - above 300: +8 u\par  [FreeTextEntry7] : Atorvastatin 40 mg [de-identified] :  Humulin NPH 16 u qpm

## 2023-07-09 ENCOUNTER — RX RENEWAL (OUTPATIENT)
Age: 68
End: 2023-07-09

## 2023-07-10 LAB
ANION GAP SERPL CALC-SCNC: 12 MMOL/L
BUN SERPL-MCNC: 21 MG/DL
CALCIUM SERPL-MCNC: 10.7 MG/DL
CHLORIDE SERPL-SCNC: 105 MMOL/L
CHOLEST SERPL-MCNC: 165 MG/DL
CO2 SERPL-SCNC: 26 MMOL/L
CREAT SERPL-MCNC: 0.9 MG/DL
CREAT SPEC-SCNC: 275 MG/DL
EGFR: 70 ML/MIN/1.73M2
ESTIMATED AVERAGE GLUCOSE: 146 MG/DL
GLUCOSE BLDC GLUCOMTR-MCNC: 128
GLUCOSE SERPL-MCNC: 111 MG/DL
HBA1C MFR BLD HPLC: 6.7 %
HDLC SERPL-MCNC: 73 MG/DL
LDLC SERPL CALC-MCNC: 81 MG/DL
MICROALBUMIN 24H UR DL<=1MG/L-MCNC: 3.3 MG/DL
MICROALBUMIN/CREAT 24H UR-RTO: 12 MG/G
NONHDLC SERPL-MCNC: 92 MG/DL
POTASSIUM SERPL-SCNC: 4.4 MMOL/L
SODIUM SERPL-SCNC: 143 MMOL/L
TRIGL SERPL-MCNC: 54 MG/DL
TSH SERPL-ACNC: 0.78 UIU/ML

## 2023-09-06 ENCOUNTER — APPOINTMENT (OUTPATIENT)
Dept: ENDOCRINOLOGY | Facility: CLINIC | Age: 68
End: 2023-09-06
Payer: MEDICARE

## 2023-09-06 VITALS
WEIGHT: 195.25 LBS | BODY MASS INDEX: 31.38 KG/M2 | HEIGHT: 66 IN | DIASTOLIC BLOOD PRESSURE: 66 MMHG | HEART RATE: 91 BPM | SYSTOLIC BLOOD PRESSURE: 130 MMHG

## 2023-09-06 PROCEDURE — 99213 OFFICE O/P EST LOW 20 MIN: CPT

## 2023-09-06 NOTE — ASSESSMENT
[FreeTextEntry1] : 1. T1D A1C goal <7% A1C - 6.7% (7/5/23) from 6.9% (3/7/23) from 6.5%(10/12/22) from 8% (1/2019) Current regimen: NPH 16-18 units in evening, Lantus 16 units QAM, Humalog 9/10/15-16 with respective meals  Presents today for insulin pump education, as desires pursuing initiation of insulin pump therapy to further assist in glycemic control.  Counseling provided today regarding benefits of pump therapy, principals of insulin pump therapy (application sites, type of insulin utilized, calculation of dosing for transition to pump), as well as current pumps on the market (Medtronic, Tandem, Omnipod) and their abilities to created closed-loop systems with CGM (guardian vs dexcom G6).  Reveiwed that in-depth training will be conducted by pump trainers from the pump company.   Discussed that Chandrakant is not currently compatible of a CGM to utilize to create closed loop, but it will likely be in the future, as well as Dexcom G7.  Demonstration provided with Tandem demo, tandem simulator, as well as omnipod demo.  Patient contemplative at this time, will research further and update me with how she would like to proceed.  Continue plan for FU with Dr. Steinberg and Mariama Land in November 2023 Will need FU ~2 weeks after pump start-up  Janet Michele MS, [facility], [facility] 09/06/2023

## 2023-09-06 NOTE — PHYSICAL EXAM
[Alert] : alert [No Acute Distress] : no acute distress [Normal Voice/Communication] : normal voice communication [Normal Hearing] : hearing was normal [No Respiratory Distress] : no respiratory distress [Normal Rate and Effort] : normal respiratory rate and effort [Normal Gait] : normal gait [Oriented x3] : oriented to person, place, and time [Normal Affect] : the affect was normal [Normal Insight/Judgement] : insight and judgment were intact [Normal Mood] : the mood was normal

## 2023-09-06 NOTE — HISTORY OF PRESENT ILLNESS
[FreeTextEntry1] : UMANG THOMAS is a 68 year old female with pmhx of T1D, hypothyroidism, HLD, thyroid nodules, osteoporosis who presents for T1D follow-up.   Patient of Dr. Steinberg, last visit 7/5/23   Interval change: Presents today to discuss insulin pump therapy Using Freestyle Chandrakant, feels "more in control", open to alternative CGM if it would pair with insulin pump Endorses improvement of hypos and hyperglycemia, but desires to better control sugar   A1C - 6.7% (7/5/23) from 6.9% (3/7/23) from 6.5%(10/12/22) from 8% (1/2019)    Current medication: - NPH 16-18 units in eveing - Lantus 16 units QAM - Humalog 9/10/15-16 with respective meals  TDD of insulin ~70 units

## 2023-09-08 ENCOUNTER — TRANSCRIPTION ENCOUNTER (OUTPATIENT)
Age: 68
End: 2023-09-08

## 2023-09-11 ENCOUNTER — TRANSCRIPTION ENCOUNTER (OUTPATIENT)
Age: 68
End: 2023-09-11

## 2023-10-04 ENCOUNTER — TRANSCRIPTION ENCOUNTER (OUTPATIENT)
Age: 68
End: 2023-10-04

## 2023-10-04 RX ORDER — INSULIN GLARGINE 100 [IU]/ML
100 INJECTION, SOLUTION SUBCUTANEOUS
Qty: 3 | Refills: 2 | Status: ACTIVE | COMMUNITY
Start: 2019-02-04 | End: 1900-01-01

## 2023-10-18 ENCOUNTER — RX RENEWAL (OUTPATIENT)
Age: 68
End: 2023-10-18

## 2023-10-18 RX ORDER — INSULIN HUMAN 100 [IU]/ML
100 INJECTION, SUSPENSION SUBCUTANEOUS
Qty: 30 | Refills: 3 | Status: ACTIVE | COMMUNITY
Start: 2018-06-28 | End: 1900-01-01

## 2023-11-07 ENCOUNTER — APPOINTMENT (OUTPATIENT)
Dept: ENDOCRINOLOGY | Facility: CLINIC | Age: 68
End: 2023-11-07
Payer: MEDICARE

## 2023-11-07 VITALS
DIASTOLIC BLOOD PRESSURE: 67 MMHG | HEIGHT: 66 IN | BODY MASS INDEX: 31.34 KG/M2 | SYSTOLIC BLOOD PRESSURE: 136 MMHG | WEIGHT: 195 LBS | HEART RATE: 102 BPM

## 2023-11-07 PROCEDURE — G0108 DIAB MANAGE TRN  PER INDIV: CPT

## 2023-11-07 PROCEDURE — 99215 OFFICE O/P EST HI 40 MIN: CPT | Mod: 25

## 2023-11-07 PROCEDURE — 36415 COLL VENOUS BLD VENIPUNCTURE: CPT

## 2023-11-27 LAB
ANION GAP SERPL CALC-SCNC: 9 MMOL/L
BUN SERPL-MCNC: 16 MG/DL
CALCIUM SERPL-MCNC: 10.1 MG/DL
CHLORIDE SERPL-SCNC: 104 MMOL/L
CHOLEST SERPL-MCNC: 133 MG/DL
CO2 SERPL-SCNC: 25 MMOL/L
CREAT SERPL-MCNC: 0.76 MG/DL
CREAT SPEC-SCNC: 77 MG/DL
EGFR: 86 ML/MIN/1.73M2
ESTIMATED AVERAGE GLUCOSE: 151 MG/DL
GLUCOSE SERPL-MCNC: 46 MG/DL
HBA1C MFR BLD HPLC: 6.9 %
HDLC SERPL-MCNC: 58 MG/DL
LDLC SERPL CALC-MCNC: 63 MG/DL
MICROALBUMIN 24H UR DL<=1MG/L-MCNC: <1.2 MG/DL
MICROALBUMIN/CREAT 24H UR-RTO: NORMAL MG/G
NONHDLC SERPL-MCNC: 75 MG/DL
POTASSIUM SERPL-SCNC: 3.9 MMOL/L
SODIUM SERPL-SCNC: 138 MMOL/L
TRIGL SERPL-MCNC: 54 MG/DL
TSH SERPL-ACNC: 1.18 UIU/ML

## 2023-11-29 ENCOUNTER — TRANSCRIPTION ENCOUNTER (OUTPATIENT)
Age: 68
End: 2023-11-29

## 2023-11-29 RX ORDER — FLASH GLUCOSE SENSOR
KIT MISCELLANEOUS
Qty: 6 | Refills: 3 | Status: ACTIVE | COMMUNITY
Start: 2022-09-19 | End: 1900-01-01

## 2023-12-05 ENCOUNTER — TRANSCRIPTION ENCOUNTER (OUTPATIENT)
Age: 68
End: 2023-12-05

## 2023-12-26 ENCOUNTER — APPOINTMENT (OUTPATIENT)
Dept: ENDOCRINOLOGY | Facility: CLINIC | Age: 68
End: 2023-12-26

## 2024-01-18 ENCOUNTER — TRANSCRIPTION ENCOUNTER (OUTPATIENT)
Age: 69
End: 2024-01-18

## 2024-01-19 ENCOUNTER — TRANSCRIPTION ENCOUNTER (OUTPATIENT)
Age: 69
End: 2024-01-19

## 2024-01-19 ENCOUNTER — NON-APPOINTMENT (OUTPATIENT)
Age: 69
End: 2024-01-19

## 2024-01-22 ENCOUNTER — TRANSCRIPTION ENCOUNTER (OUTPATIENT)
Age: 69
End: 2024-01-22

## 2024-01-23 ENCOUNTER — RX RENEWAL (OUTPATIENT)
Age: 69
End: 2024-01-23

## 2024-01-23 RX ORDER — ATORVASTATIN CALCIUM 40 MG/1
40 TABLET, FILM COATED ORAL
Qty: 90 | Refills: 3 | Status: ACTIVE | COMMUNITY
Start: 2018-05-04 | End: 1900-01-01

## 2024-01-25 ENCOUNTER — TRANSCRIPTION ENCOUNTER (OUTPATIENT)
Age: 69
End: 2024-01-25

## 2024-01-31 ENCOUNTER — TRANSCRIPTION ENCOUNTER (OUTPATIENT)
Age: 69
End: 2024-01-31

## 2024-02-09 ENCOUNTER — TRANSCRIPTION ENCOUNTER (OUTPATIENT)
Age: 69
End: 2024-02-09

## 2024-03-04 ENCOUNTER — TRANSCRIPTION ENCOUNTER (OUTPATIENT)
Age: 69
End: 2024-03-04

## 2024-03-04 ENCOUNTER — RX RENEWAL (OUTPATIENT)
Age: 69
End: 2024-03-04

## 2024-03-04 RX ORDER — INSULIN LISPRO 100 [IU]/ML
100 INJECTION, SOLUTION INTRAVENOUS; SUBCUTANEOUS
Qty: 3 | Refills: 2 | Status: ACTIVE | COMMUNITY
Start: 2022-11-02 | End: 1900-01-01

## 2024-03-04 RX ORDER — FOSINOPRIL SODIUM 20 MG/1
20 TABLET ORAL
Qty: 90 | Refills: 3 | Status: ACTIVE | COMMUNITY
Start: 2018-06-28 | End: 1900-01-01

## 2024-04-19 ENCOUNTER — RX RENEWAL (OUTPATIENT)
Age: 69
End: 2024-04-19

## 2024-04-19 RX ORDER — LEVOTHYROXINE SODIUM 0.1 MG/1
100 TABLET ORAL DAILY
Qty: 90 | Refills: 2 | Status: ACTIVE | COMMUNITY
Start: 2018-06-28 | End: 1900-01-01

## 2024-05-01 ENCOUNTER — TRANSCRIPTION ENCOUNTER (OUTPATIENT)
Age: 69
End: 2024-05-01

## 2024-05-05 LAB
ANION GAP SERPL CALC-SCNC: 9 MMOL/L
BUN SERPL-MCNC: 16 MG/DL
CALCIUM SERPL-MCNC: 10.1 MG/DL
CHLORIDE SERPL-SCNC: 107 MMOL/L
CHOLEST SERPL-MCNC: 161 MG/DL
CO2 SERPL-SCNC: 25 MMOL/L
CREAT SERPL-MCNC: 0.74 MG/DL
CREAT SPEC-SCNC: 70 MG/DL
EGFR: 88 ML/MIN/1.73M2
ESTIMATED AVERAGE GLUCOSE: 146 MG/DL
GLUCOSE SERPL-MCNC: 103 MG/DL
HBA1C MFR BLD HPLC: 6.7 %
HDLC SERPL-MCNC: 65 MG/DL
LDLC SERPL CALC-MCNC: 82 MG/DL
MICROALBUMIN 24H UR DL<=1MG/L-MCNC: <1.2 MG/DL
MICROALBUMIN/CREAT 24H UR-RTO: NORMAL MG/G
NONHDLC SERPL-MCNC: 95 MG/DL
POTASSIUM SERPL-SCNC: 4.2 MMOL/L
SODIUM SERPL-SCNC: 141 MMOL/L
TRIGL SERPL-MCNC: 66 MG/DL
TSH SERPL-ACNC: 1.41 UIU/ML

## 2024-05-07 ENCOUNTER — TRANSCRIPTION ENCOUNTER (OUTPATIENT)
Age: 69
End: 2024-05-07

## 2024-05-14 ENCOUNTER — APPOINTMENT (OUTPATIENT)
Dept: ENDOCRINOLOGY | Facility: CLINIC | Age: 69
End: 2024-05-14
Payer: MEDICARE

## 2024-05-14 DIAGNOSIS — E04.2 NONTOXIC MULTINODULAR GOITER: ICD-10-CM

## 2024-05-14 DIAGNOSIS — E10.9 TYPE 1 DIABETES MELLITUS W/OUT COMPLICATIONS: ICD-10-CM

## 2024-05-14 DIAGNOSIS — E03.9 HYPOTHYROIDISM, UNSPECIFIED: ICD-10-CM

## 2024-05-14 PROCEDURE — 99215 OFFICE O/P EST HI 40 MIN: CPT

## 2024-05-15 NOTE — HISTORY OF PRESENT ILLNESS
[Finger Stick] : Finger Stick: Yes [Hypoglycemia] : Patient is hypoglycemic. [FreeTextEntry1] : 69 y/o F pt, with Hx of T1DM (dx. ~2002), Hx of hypothyroidism, and Hx of thyroid nodules. 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 Never Smoker. Last podiatrist visit: couple weeks ago (noted 02/24/22)- managed R fungal nail.  Last funduscopic visit: 2023 Last cardiologist visit: 2 months ago at [facility]. Last cardiac stress test was ~ 3-4 yrs ago.   Review of pt's chart: - 02/2022 10-yr ASCVD risk calculation: 7.53%  07/05/2023 Pt has POCT 128, /68  and BMI 32.12. She lost 5 lbs in 4 months.  CC: "I'm doing okay." Pt states her BS levels have been fluctuating a bit. She experienced hypoglycemic episode last night from 2-5 am. She endorses daytime hypoglycemias several times per week. As per pt she takes insulin with her meals "by her gut". She does not have a sliding scale. Overview of FBS on pt's phone: average 120.  She will see cardiologist later this month. She saw ophthalmologist recently, unremarkable as per pt.  Pt sates she has been sleeping past breakfast time. Denies breathing difficulties, dysphagia, and dysphonia.   11/07/2023 Pt has /67 and BMI 31.47. She lost 4 lbs in 4 months.  CC: "I am feeling okay".  She states she has changed her diet and lifestyle in order to lose weight. Pt denies difficulty breathing, difficulty swallowing, and voice changes. She reports she is up to date with the ophthalmologist and got a new prescription. She states she is currently experiencing hypoglycemic episode.   05/14/2024 CC: Pt is here for diabetes management Labs:5/4/2024 A1C 6.7, TSH 1.41, LDL-c 82, s.creat 0.74, ACR 70/<1.2  Pt is aware of hypoglycemias using CGM Freestyle Chandrakant and reports better management of glucose levels.  [Medications verified as per pt on 05/14/2024] Current Medications: NPH 16 u qpm, Lantus 16-18 u qam , Humalog 8-10u, Atorvastatin 40 mg, Levothyroxine 100 mcg, Fosinopril 20 mg, Verapamil 240 mg (increased 2 yrs ago), methylphedinate 2x5mg qd, ASA 81mg qd,

## 2024-05-15 NOTE — END OF VISIT
[FreeTextEntry3] : All medical record entries made by the Scribe were at my, Dr. Richie Steinberg, direction and personally dictated by me on 05/14/2024. I have reviewed the chart and agree that the record accurately reflects my personal performance of the history, physical exam, assessment and plan. I have also personally directed, reviewed and agreed with the chart. [Time Spent: ___ minutes] : I have spent [unfilled] minutes of time on the encounter.

## 2024-05-15 NOTE — RESULTS/DATA
[Hologic] : hologic [T-Score ___] : T-score: [unfilled] [Major Osteoporotic Fracture (%): ___] : Major Osteoporotic Fracture (%):[unfilled] [Hip Fracture (%):___] : Hip Fracture (%): [unfilled] [FreeTextEntry2] : 11/08/2022 [FreeTextEntry1] : Impression: According to the WHO classification, the patient has low bone mass (osteopenia) in the left hip and left forearm. \par   [FreeTextEntry4] : 11/22/2019 [de-identified] : IMPRESSION:\par  Normal bone density in the left hip and lumbar spine.

## 2024-05-15 NOTE — THERAPY
[Today's Date] : [unfilled] [Lantus] : Lantus [Humalog] : Humalog [FreeTextEntry9] : 16 u qam [de-identified] : 6/6/8 u ac +ss: For glucose readings less than 100: no additional insulin.\par  For glucose between: \par  - 101-150: +2 u, \par  - 151-200: +4 u, \par  - 201-250: +5 u, \par  - 251-300: +6 u, \par  - above 300: +8 u\par   [de-identified] :  Humulin NPH 16 u qpm [FreeTextEntry7] : Atorvastatin 40 mg

## 2024-05-15 NOTE — PHYSICAL EXAM
[No Acute Distress] : no acute distress [Normal Sclera/Conjunctiva] : normal sclera/conjunctiva [Normal Outer Ear/Nose] : the ears and nose were normal in appearance [Thyroid Not Enlarged] : the thyroid was not enlarged [No Thyroid Nodules] : no palpable thyroid nodules [Clear to Auscultation] : lungs were clear to auscultation bilaterally [Normal Rate] : heart rate was normal [Regular Rhythm] : with a regular rhythm [Soft] : abdomen soft [Kyphosis] : kyphosis present [No Rash] : no rash [Normal Reflexes] : deep tendon reflexes were 2+ and symmetric [Oriented x3] : oriented to person, place, and time [Acanthosis Nigricans] : no acanthosis nigricans [de-identified] : 3+ pitting LE edema.  [de-identified] : Walks with cane.  [de-identified] : R great toe with fungal nails. DTR reflex in ankle diminished b/l.

## 2024-05-15 NOTE — DATA REVIEWED
[FreeTextEntry1] : Scanned Labs: - 12/13/22 Thyroid FNA: R Lower Pole 1.6 cm [place] II, L Midpole 2.1 cm [place] III and Afirma benign. - 06/05/21: A1c 7.0%, s.creat 0.82, LDL-c 76, TSH 0.33, Free T4 1.2, Vit D 25-OH 47 - 07/31/20: A1c 7.6%, s.creat 0.80, LDL-c 73, Microalb/Creat 7, TSH 0.89, Free T4 1.3 - 11/16/19: A1c 7.2%, s.creat 0.81, LDL-c 66, Microalb/Creat 9, TSH 0.38, Free T4 1.3 - 06/22/19: A1c 7.0%, s.creat 0.77, LDL-c 106  Imaging: - 11/08/22 Thyroid US: Findings: RIGHT LOBE: Dimensions: 4.3 x 2.1 x 1.7 cm (sagittal x AP x transverse). Previously measured 3.8 x 1.9 x 1.7 cm. Echotexture: Heterogeneous. Vascularity: Normal. The right lobe contains three nodules Nodule 1: Location: Midpole. Dimensions: 1.5 x 0.9 x 1.1 cm (sagittal x AP x transverse). Previously 1.4 x 1.0 x 1.1 cm. Composition: Solid, echogenic. Nodule 2: Location: Mid-lower pole. Dimensions: 1.0 x 0.8 x 0.7 cm (sagittal x AP x transverse). Previously measured 0.7 x 0.6 x 0.6 cm. Composition: Spongiform. Nodule 3: Location: Lower pole. Dimensions: 1.6 x 1.3 x 1.5 cm (sagittal x AP x transverse). Previously measured 1.1 x 1.1 x 0.9 cm. Composition: Mostly solid with central small cystic component. LEFT LOBE: Dimensions: 3.9 x 1.8 x 1.7 cm (sagittal x AP x transverse). Previously measured 4.1 x 1.8 x 1.3 cm. Echotexture: Homogeneous. Vascularity: Normal. The left lobe contains one nodule Nodule 1: Location: Mid to lower pole. Dimensions: 2.1 x 1.8 x 2.0 cm (sagittal x AP x transverse). Previously measured 1.7 x 1.5 x 0.9 cm. Composition: Mostly solid and echogenic with a few small cystic areas. ISTHMUS: Dimensions: 0.2 cm AP. The isthmus lobe contains no visible nodules. Cervical Lymph Node Evaluation: No abnormal lymph nodes are identified in the neck. Parathyroid Examination: Parathyroid glands not visualized. IMPRESSION: Since 3/17/2018, there has been an increase in the size of three right-sided thyroid nodules and one left-sided thyroid nodule. All of the nodules meet criteria for biopsy. - 11/22/19 BMD: L hip T-score 0.9, Femoral neck 0.1, Spine T-score 0.8. - 08/21/18 FNA L mid 1.1 cm nodule: [place] II - 03/17/18 Thyroid US: R lobe inferior nodule 1: 1.1 x 1.1 x 0.9 cm solid, nodule 2: 1.1x 1.1 x 1.1 cm solid, nodule 3: 0.7 cm. L lobe mid inferior 1.7 x 1.5 x 0.9 cm solid nodule, nodule 2 inferior 1.3 x 0.9 x 1.1 solid.  - 11/2012 Thyroid US: L thyroid nodule 1.7 cm, and R 0.5 cm nodule with calcification.

## 2024-05-15 NOTE — ADDENDUM
[FreeTextEntry1] :  I, Sundar Merchant, act solely as a scribe for Dr. Richie Steinberg on this date. 05/14/2024

## 2024-06-18 ENCOUNTER — TRANSCRIPTION ENCOUNTER (OUTPATIENT)
Age: 69
End: 2024-06-18

## 2024-06-18 RX ORDER — CALCIUM CARB/VITAMIN D3/VIT K1 500-100-40
31G X 5/16" TABLET,CHEWABLE ORAL
Qty: 300 | Refills: 2 | Status: ACTIVE | COMMUNITY
Start: 2018-07-10 | End: 1900-01-01

## 2024-06-20 ENCOUNTER — TRANSCRIPTION ENCOUNTER (OUTPATIENT)
Age: 69
End: 2024-06-20

## 2024-11-11 ENCOUNTER — APPOINTMENT (OUTPATIENT)
Dept: ENDOCRINOLOGY | Facility: CLINIC | Age: 69
End: 2024-11-11
Payer: MEDICARE

## 2024-11-11 VITALS
WEIGHT: 192 LBS | DIASTOLIC BLOOD PRESSURE: 76 MMHG | HEART RATE: 94 BPM | BODY MASS INDEX: 30.99 KG/M2 | SYSTOLIC BLOOD PRESSURE: 157 MMHG

## 2024-11-11 DIAGNOSIS — E10.9 TYPE 1 DIABETES MELLITUS W/OUT COMPLICATIONS: ICD-10-CM

## 2024-11-11 DIAGNOSIS — M81.0 AGE-RELATED OSTEOPOROSIS W/OUT CURRENT PATHOLOGICAL FRACTURE: ICD-10-CM

## 2024-11-11 DIAGNOSIS — E03.9 HYPOTHYROIDISM, UNSPECIFIED: ICD-10-CM

## 2024-11-11 DIAGNOSIS — E04.2 NONTOXIC MULTINODULAR GOITER: ICD-10-CM

## 2024-11-11 PROCEDURE — G2211 COMPLEX E/M VISIT ADD ON: CPT

## 2024-11-11 PROCEDURE — 99215 OFFICE O/P EST HI 40 MIN: CPT

## 2024-11-15 LAB — GLUCOSE BLDC GLUCOMTR-MCNC: 188

## 2025-01-06 ENCOUNTER — RX RENEWAL (OUTPATIENT)
Age: 70
End: 2025-01-06

## 2025-02-24 ENCOUNTER — TRANSCRIPTION ENCOUNTER (OUTPATIENT)
Age: 70
End: 2025-02-24

## 2025-02-24 RX ORDER — INSULIN GLARGINE 100 [IU]/ML
100 INJECTION, SOLUTION SUBCUTANEOUS
Qty: 1 | Refills: 1 | Status: ACTIVE | COMMUNITY
Start: 2025-02-24 | End: 1900-01-01

## 2025-03-06 ENCOUNTER — APPOINTMENT (OUTPATIENT)
Dept: RADIOLOGY | Facility: CLINIC | Age: 70
End: 2025-03-06
Payer: MEDICARE

## 2025-03-06 ENCOUNTER — OUTPATIENT (OUTPATIENT)
Dept: OUTPATIENT SERVICES | Facility: HOSPITAL | Age: 70
LOS: 1 days | End: 2025-03-06

## 2025-03-06 ENCOUNTER — APPOINTMENT (OUTPATIENT)
Dept: ULTRASOUND IMAGING | Facility: CLINIC | Age: 70
End: 2025-03-06
Payer: MEDICARE

## 2025-03-06 PROCEDURE — 77080 DXA BONE DENSITY AXIAL: CPT | Mod: 26

## 2025-03-06 PROCEDURE — 76536 US EXAM OF HEAD AND NECK: CPT | Mod: 26

## 2025-03-13 ENCOUNTER — TRANSCRIPTION ENCOUNTER (OUTPATIENT)
Age: 70
End: 2025-03-13

## 2025-03-13 DIAGNOSIS — E03.9 HYPOTHYROIDISM, UNSPECIFIED: ICD-10-CM

## 2025-03-13 DIAGNOSIS — E10.9 TYPE 1 DIABETES MELLITUS W/OUT COMPLICATIONS: ICD-10-CM

## 2025-03-20 ENCOUNTER — NON-APPOINTMENT (OUTPATIENT)
Age: 70
End: 2025-03-20

## 2025-03-25 ENCOUNTER — APPOINTMENT (OUTPATIENT)
Dept: ENDOCRINOLOGY | Facility: CLINIC | Age: 70
End: 2025-03-25
Payer: MEDICARE

## 2025-03-25 VITALS
WEIGHT: 193 LBS | BODY MASS INDEX: 31.02 KG/M2 | SYSTOLIC BLOOD PRESSURE: 136 MMHG | DIASTOLIC BLOOD PRESSURE: 79 MMHG | HEART RATE: 94 BPM | HEIGHT: 66 IN

## 2025-03-25 DIAGNOSIS — E04.2 NONTOXIC MULTINODULAR GOITER: ICD-10-CM

## 2025-03-25 DIAGNOSIS — E10.9 TYPE 1 DIABETES MELLITUS W/OUT COMPLICATIONS: ICD-10-CM

## 2025-03-25 DIAGNOSIS — E03.9 HYPOTHYROIDISM, UNSPECIFIED: ICD-10-CM

## 2025-03-25 DIAGNOSIS — Z87.39 PERSONAL HISTORY OF OTHER DISEASES OF THE MUSCULOSKELETAL SYSTEM AND CONNECTIVE TISSUE: ICD-10-CM

## 2025-03-25 DIAGNOSIS — M85.80 OTHER SPECIFIED DISORDERS OF BONE DENSITY AND STRUCTURE, UNSPECIFIED SITE: ICD-10-CM

## 2025-03-25 LAB — GLUCOSE BLDC GLUCOMTR-MCNC: 85

## 2025-03-25 PROCEDURE — 82962 GLUCOSE BLOOD TEST: CPT

## 2025-03-25 PROCEDURE — 99215 OFFICE O/P EST HI 40 MIN: CPT

## 2025-03-25 PROCEDURE — G2211 COMPLEX E/M VISIT ADD ON: CPT

## 2025-03-26 ENCOUNTER — RX RENEWAL (OUTPATIENT)
Age: 70
End: 2025-03-26

## 2025-03-26 ENCOUNTER — APPOINTMENT (OUTPATIENT)
Dept: ENDOCRINOLOGY | Facility: CLINIC | Age: 70
End: 2025-03-26

## 2025-03-27 PROBLEM — Z87.39 HISTORY OF POSTMENOPAUSAL OSTEOPOROSIS: Status: RESOLVED | Noted: 2017-03-09 | Resolved: 2025-03-27

## 2025-03-31 ENCOUNTER — TRANSCRIPTION ENCOUNTER (OUTPATIENT)
Age: 70
End: 2025-03-31

## 2025-04-01 ENCOUNTER — TRANSCRIPTION ENCOUNTER (OUTPATIENT)
Age: 70
End: 2025-04-01

## 2025-04-02 ENCOUNTER — TRANSCRIPTION ENCOUNTER (OUTPATIENT)
Age: 70
End: 2025-04-02

## 2025-06-13 ENCOUNTER — TRANSCRIPTION ENCOUNTER (OUTPATIENT)
Age: 70
End: 2025-06-13

## 2025-06-20 ENCOUNTER — TRANSCRIPTION ENCOUNTER (OUTPATIENT)
Age: 70
End: 2025-06-20

## 2025-06-27 ENCOUNTER — TRANSCRIPTION ENCOUNTER (OUTPATIENT)
Age: 70
End: 2025-06-27

## 2025-06-27 ENCOUNTER — RX RENEWAL (OUTPATIENT)
Age: 70
End: 2025-06-27

## 2025-07-10 ENCOUNTER — TRANSCRIPTION ENCOUNTER (OUTPATIENT)
Age: 70
End: 2025-07-10

## 2025-07-26 ENCOUNTER — TRANSCRIPTION ENCOUNTER (OUTPATIENT)
Age: 70
End: 2025-07-26

## 2025-08-25 ENCOUNTER — NON-APPOINTMENT (OUTPATIENT)
Age: 70
End: 2025-08-25

## 2025-08-26 ENCOUNTER — APPOINTMENT (OUTPATIENT)
Dept: ENDOCRINOLOGY | Facility: CLINIC | Age: 70
End: 2025-08-26
Payer: MEDICARE

## 2025-08-26 VITALS
WEIGHT: 186 LBS | DIASTOLIC BLOOD PRESSURE: 75 MMHG | HEART RATE: 97 BPM | BODY MASS INDEX: 29.89 KG/M2 | HEIGHT: 66 IN | SYSTOLIC BLOOD PRESSURE: 157 MMHG

## 2025-08-26 DIAGNOSIS — E04.2 NONTOXIC MULTINODULAR GOITER: ICD-10-CM

## 2025-08-26 DIAGNOSIS — M85.80 OTHER SPECIFIED DISORDERS OF BONE DENSITY AND STRUCTURE, UNSPECIFIED SITE: ICD-10-CM

## 2025-08-26 DIAGNOSIS — E03.9 HYPOTHYROIDISM, UNSPECIFIED: ICD-10-CM

## 2025-08-26 DIAGNOSIS — E10.9 TYPE 1 DIABETES MELLITUS W/OUT COMPLICATIONS: ICD-10-CM

## 2025-08-26 PROCEDURE — 83036 HEMOGLOBIN GLYCOSYLATED A1C: CPT | Mod: QW

## 2025-08-26 PROCEDURE — 82962 GLUCOSE BLOOD TEST: CPT

## 2025-08-26 PROCEDURE — 99215 OFFICE O/P EST HI 40 MIN: CPT | Mod: 25

## 2025-08-31 LAB
GLUCOSE BLDC GLUCOMTR-MCNC: 196
HBA1C MFR BLD HPLC: 7.2